# Patient Record
Sex: MALE | Race: WHITE | NOT HISPANIC OR LATINO | Employment: FULL TIME | URBAN - METROPOLITAN AREA
[De-identification: names, ages, dates, MRNs, and addresses within clinical notes are randomized per-mention and may not be internally consistent; named-entity substitution may affect disease eponyms.]

---

## 2017-03-08 ENCOUNTER — OFFICE VISIT (OUTPATIENT)
Dept: ORTHOPEDIC SURGERY | Facility: CLINIC | Age: 37
End: 2017-03-08

## 2017-03-08 DIAGNOSIS — M17.0 PRIMARY OSTEOARTHRITIS OF BOTH KNEES: Primary | ICD-10-CM

## 2017-03-08 PROCEDURE — 20610 DRAIN/INJ JOINT/BURSA W/O US: CPT | Performed by: ORTHOPAEDIC SURGERY

## 2017-03-08 RX ORDER — LIDOCAINE HYDROCHLORIDE 10 MG/ML
8 INJECTION, SOLUTION INFILTRATION; PERINEURAL
Status: COMPLETED | OUTPATIENT
Start: 2017-03-08 | End: 2017-03-08

## 2017-03-08 RX ORDER — TRIAMCINOLONE ACETONIDE 40 MG/ML
40 INJECTION, SUSPENSION INTRA-ARTICULAR; INTRAMUSCULAR
Status: COMPLETED | OUTPATIENT
Start: 2017-03-08 | End: 2017-03-08

## 2017-03-08 RX ADMIN — TRIAMCINOLONE ACETONIDE 40 MG: 40 INJECTION, SUSPENSION INTRA-ARTICULAR; INTRAMUSCULAR at 13:08

## 2017-03-08 RX ADMIN — LIDOCAINE HYDROCHLORIDE 8 ML: 10 INJECTION, SOLUTION INFILTRATION; PERINEURAL at 13:08

## 2017-03-08 NOTE — PROGRESS NOTES
Subjective: Bilateral knee pain     Patient ID: Carson Crespo is a 36 y.o. male.    Chief Complaint:    History of Present Illness 36 year old male known to me seen once again for bilateral knee pain.  Was seen over 6 months ago and underwent cortisone injection and did well to just recently.  Again now is having pain with activities of daily living.       Social History     Occupational History   • Not on file.     Social History Main Topics   • Smoking status: Current Every Day Smoker   • Smokeless tobacco: Current User   • Alcohol use Yes      Comment: 3   • Drug use: Defer   • Sexual activity: Defer      Review of Systems   Constitutional: Negative for chills, diaphoresis, fever and unexpected weight change.   HENT: Negative for hearing loss, nosebleeds, sore throat and tinnitus.    Eyes: Negative for pain and visual disturbance.   Respiratory: Negative for cough, shortness of breath and wheezing.    Cardiovascular: Negative for chest pain and palpitations.   Gastrointestinal: Negative for abdominal pain, diarrhea, nausea and vomiting.   Endocrine: Negative for cold intolerance, heat intolerance and polydipsia.   Genitourinary: Negative for difficulty urinating, dysuria and hematuria.   Musculoskeletal: Negative for arthralgias, joint swelling and myalgias.   Skin: Negative for rash and wound.   Allergic/Immunologic: Negative for environmental allergies.   Neurological: Negative for dizziness, syncope and numbness.   Hematological: Does not bruise/bleed easily.   Psychiatric/Behavioral: Negative for dysphoric mood and sleep disturbance. The patient is not nervous/anxious.    All other systems reviewed and are negative.        Past Medical History   Diagnosis Date   • CTS (carpal tunnel syndrome)    • Osteoarthritis    • Skin cancer    • Tear of meniscus of knee      No past surgical history on file.  No family history on file.      Objective:  There were no vitals filed for this visit.  There were no vitals filed  for this visit.  There is no height or weight on file to calculate BMI.       Ortho Exam  both knees have no effusion swelling erythema.  The skin is cool to touch.  His no motor deficit good distal pulses.  He has 0-125 possibly 130° of motion.  Patellofemoral crepitus but no instability.  Joint line tenderness but negative Lucho's.    Assessment:       1. Primary osteoarthritis of both knees          Plan:      .  A good response to cortisone injections last fall summer to reinject both knees with 8 cc lidocaine 1 cc Kenalog.  That was accomplished the superior lateral portal after sterile prep without Complications tolerating that well.  Postinjection instructions given to the patient.  Return as needed.      Work Status:    Brndstr query complete.    Orders:  Orders Placed This Encounter   Procedures   • Large Joint Arthrocentesis       Medications:  No orders of the defined types were placed in this encounter.      Followup:  Return if symptoms worsen or fail to improve.        Large Joint Arthrocentesis  Date/Time: 3/8/2017 1:08 PM  Consent given by: patient  Site marked: site marked  Timeout: Immediately prior to procedure a time out was called to verify the correct patient, procedure, equipment, support staff and site/side marked as required   Supporting Documentation  Indications: pain   Procedure Details  Location: knee - Knee joint: bilateral.  Preparation: Patient was prepped and draped in the usual sterile fashion  Needle size: 22 G  Medications administered: 8 mL lidocaine 1 %; 40 mg triamcinolone acetonide 40 MG/ML  Patient tolerance: patient tolerated the procedure well with no immediate complications          Dragon transcription disclaimer     Much of this encounter note is an electronic transcription/translation of spoken language to printed text. The electronic translation of spoken language may permit erroneous, or at times, nonsensical words or phrases to be inadvertently transcribed.  Although I have reviewed the note for such errors, some may still exist.

## 2017-12-05 ENCOUNTER — OFFICE (AMBULATORY)
Dept: URBAN - METROPOLITAN AREA CLINIC 75 | Facility: CLINIC | Age: 37
End: 2017-12-05

## 2017-12-05 VITALS
WEIGHT: 192 LBS | SYSTOLIC BLOOD PRESSURE: 122 MMHG | HEART RATE: 64 BPM | HEIGHT: 72 IN | DIASTOLIC BLOOD PRESSURE: 68 MMHG

## 2017-12-05 DIAGNOSIS — R13.10 DYSPHAGIA, UNSPECIFIED: ICD-10-CM

## 2017-12-05 DIAGNOSIS — K21.9 GASTRO-ESOPHAGEAL REFLUX DISEASE WITHOUT ESOPHAGITIS: ICD-10-CM

## 2017-12-05 PROCEDURE — 99204 OFFICE O/P NEW MOD 45 MIN: CPT | Performed by: INTERNAL MEDICINE

## 2018-01-05 VITALS
RESPIRATION RATE: 7 BRPM | HEART RATE: 68 BPM | HEART RATE: 55 BPM | OXYGEN SATURATION: 97 % | TEMPERATURE: 96.8 F | SYSTOLIC BLOOD PRESSURE: 110 MMHG | SYSTOLIC BLOOD PRESSURE: 116 MMHG | DIASTOLIC BLOOD PRESSURE: 76 MMHG | HEIGHT: 72 IN | SYSTOLIC BLOOD PRESSURE: 120 MMHG | HEART RATE: 71 BPM | TEMPERATURE: 97.5 F | SYSTOLIC BLOOD PRESSURE: 107 MMHG | DIASTOLIC BLOOD PRESSURE: 69 MMHG | RESPIRATION RATE: 18 BRPM | RESPIRATION RATE: 14 BRPM | HEART RATE: 78 BPM | SYSTOLIC BLOOD PRESSURE: 123 MMHG | OXYGEN SATURATION: 98 % | HEART RATE: 79 BPM | OXYGEN SATURATION: 91 % | DIASTOLIC BLOOD PRESSURE: 87 MMHG | HEART RATE: 54 BPM | OXYGEN SATURATION: 93 % | DIASTOLIC BLOOD PRESSURE: 68 MMHG | HEART RATE: 63 BPM | OXYGEN SATURATION: 99 % | RESPIRATION RATE: 20 BRPM | OXYGEN SATURATION: 96 % | WEIGHT: 185 LBS | DIASTOLIC BLOOD PRESSURE: 61 MMHG

## 2018-01-08 ENCOUNTER — OFFICE (AMBULATORY)
Dept: URBAN - METROPOLITAN AREA PATHOLOGY 4 | Facility: PATHOLOGY | Age: 38
End: 2018-01-08
Payer: COMMERCIAL

## 2018-01-08 ENCOUNTER — AMBULATORY SURGICAL CENTER (AMBULATORY)
Dept: URBAN - METROPOLITAN AREA SURGERY 17 | Facility: SURGERY | Age: 38
End: 2018-01-08

## 2018-01-08 DIAGNOSIS — K20.9 ESOPHAGITIS, UNSPECIFIED: ICD-10-CM

## 2018-01-08 DIAGNOSIS — K25.9 GASTRIC ULCER, UNSPECIFIED AS ACUTE OR CHRONIC, WITHOUT HEMO: ICD-10-CM

## 2018-01-08 DIAGNOSIS — K29.50 UNSPECIFIED CHRONIC GASTRITIS WITHOUT BLEEDING: ICD-10-CM

## 2018-01-08 DIAGNOSIS — K22.70 BARRETT'S ESOPHAGUS WITHOUT DYSPLASIA: ICD-10-CM

## 2018-01-08 DIAGNOSIS — K25.4 CHRONIC OR UNSPECIFIED GASTRIC ULCER WITH HEMORRHAGE: ICD-10-CM

## 2018-01-08 DIAGNOSIS — K22.2 ESOPHAGEAL OBSTRUCTION: ICD-10-CM

## 2018-01-08 DIAGNOSIS — R13.10 DYSPHAGIA, UNSPECIFIED: ICD-10-CM

## 2018-01-08 DIAGNOSIS — K44.9 DIAPHRAGMATIC HERNIA WITHOUT OBSTRUCTION OR GANGRENE: ICD-10-CM

## 2018-01-08 LAB
GI HISTOLOGY: A. UNSPECIFIED: (no result)
GI HISTOLOGY: B. UNSPECIFIED: (no result)
GI HISTOLOGY: C. UNSPECIFIED: (no result)
GI HISTOLOGY: PDF REPORT: (no result)

## 2018-01-08 PROCEDURE — 88342 IMHCHEM/IMCYTCHM 1ST ANTB: CPT | Performed by: INTERNAL MEDICINE

## 2018-01-08 PROCEDURE — 88341 IMHCHEM/IMCYTCHM EA ADD ANTB: CPT | Performed by: INTERNAL MEDICINE

## 2018-01-08 PROCEDURE — 88305 TISSUE EXAM BY PATHOLOGIST: CPT | Performed by: INTERNAL MEDICINE

## 2018-01-08 PROCEDURE — 43249 ESOPH EGD DILATION <30 MM: CPT | Performed by: INTERNAL MEDICINE

## 2018-01-08 PROCEDURE — 43239 EGD BIOPSY SINGLE/MULTIPLE: CPT | Mod: 59 | Performed by: INTERNAL MEDICINE

## 2018-01-08 RX ORDER — OMEPRAZOLE 20 MG/1
20 CAPSULE, DELAYED RELEASE ORAL
Qty: 30 | Refills: 10 | Status: COMPLETED
End: 2024-08-05

## 2018-01-08 RX ADMIN — PROPOFOL 75 MG: 10 INJECTION, EMULSION INTRAVENOUS at 13:31

## 2018-01-08 RX ADMIN — PROPOFOL 50 MG: 10 INJECTION, EMULSION INTRAVENOUS at 13:35

## 2018-01-08 RX ADMIN — PROPOFOL 50 MG: 10 INJECTION, EMULSION INTRAVENOUS at 13:39

## 2018-01-08 RX ADMIN — PROPOFOL 50 MG: 10 INJECTION, EMULSION INTRAVENOUS at 13:37

## 2018-01-08 RX ADMIN — PROPOFOL 50 MG: 10 INJECTION, EMULSION INTRAVENOUS at 13:33

## 2018-01-08 RX ADMIN — LIDOCAINE HYDROCHLORIDE 50 MG: 10 INJECTION, SOLUTION EPIDURAL; INFILTRATION; INTRACAUDAL; PERINEURAL at 13:31

## 2018-02-28 ENCOUNTER — OFFICE VISIT (OUTPATIENT)
Dept: ORTHOPEDIC SURGERY | Facility: CLINIC | Age: 38
End: 2018-02-28

## 2018-02-28 DIAGNOSIS — M17.0 PRIMARY OSTEOARTHRITIS OF BOTH KNEES: ICD-10-CM

## 2018-02-28 DIAGNOSIS — R52 PAIN: Primary | ICD-10-CM

## 2018-02-28 PROCEDURE — 99213 OFFICE O/P EST LOW 20 MIN: CPT | Performed by: ORTHOPAEDIC SURGERY

## 2018-02-28 PROCEDURE — 20610 DRAIN/INJ JOINT/BURSA W/O US: CPT | Performed by: ORTHOPAEDIC SURGERY

## 2018-02-28 PROCEDURE — 73562 X-RAY EXAM OF KNEE 3: CPT | Performed by: ORTHOPAEDIC SURGERY

## 2018-02-28 RX ORDER — LIDOCAINE HYDROCHLORIDE 10 MG/ML
8 INJECTION, SOLUTION EPIDURAL; INFILTRATION; INTRACAUDAL; PERINEURAL
Status: COMPLETED | OUTPATIENT
Start: 2018-02-28 | End: 2018-02-28

## 2018-02-28 RX ORDER — TRIAMCINOLONE ACETONIDE 40 MG/ML
40 INJECTION, SUSPENSION INTRA-ARTICULAR; INTRAMUSCULAR
Status: COMPLETED | OUTPATIENT
Start: 2018-02-28 | End: 2018-02-28

## 2018-02-28 RX ADMIN — LIDOCAINE HYDROCHLORIDE 8 ML: 10 INJECTION, SOLUTION EPIDURAL; INFILTRATION; INTRACAUDAL; PERINEURAL at 15:32

## 2018-02-28 RX ADMIN — TRIAMCINOLONE ACETONIDE 40 MG: 40 INJECTION, SUSPENSION INTRA-ARTICULAR; INTRAMUSCULAR at 15:32

## 2018-02-28 NOTE — PROGRESS NOTES
Subjective: Bilateral knee pain     Patient ID: Carson Crespo is a 37 y.o. male.    Chief Complaint:    History of Present Illness 37-year-old male known to although has not been seen since last March returns with bilateral knee pain left worse than right.  Once again is experiencing discomfort with rest and with activity particularly getting in and out of a chair and navigating.  Last March underwent bilateral cortisone injections which helped until just recently.  Also previously been taking meloxicam but had to stop that medication as he developed esophagitis.  He describes the pain in both knees is quite disabling interfering with most of not all activities of daily living.       Social History     Occupational History   • Not on file.     Social History Main Topics   • Smoking status: Current Every Day Smoker   • Smokeless tobacco: Current User   • Alcohol use Yes      Comment: 3   • Drug use: Defer   • Sexual activity: Defer      Review of Systems   Constitutional: Negative for chills, diaphoresis, fever and unexpected weight change.   HENT: Negative for hearing loss, nosebleeds, sore throat and tinnitus.    Eyes: Negative for pain and visual disturbance.   Respiratory: Negative for cough, shortness of breath and wheezing.    Cardiovascular: Negative for chest pain and palpitations.   Gastrointestinal: Negative for abdominal pain, diarrhea, nausea and vomiting.   Endocrine: Negative for cold intolerance, heat intolerance and polydipsia.   Genitourinary: Negative for difficulty urinating, dysuria and hematuria.   Musculoskeletal: Positive for arthralgias. Negative for joint swelling and myalgias.   Skin: Negative for rash and wound.   Allergic/Immunologic: Negative for environmental allergies.   Neurological: Negative for dizziness, syncope and numbness.   Hematological: Does not bruise/bleed easily.   Psychiatric/Behavioral: Negative for dysphoric mood and sleep disturbance. The patient is not nervous/anxious.           Past Medical History:   Diagnosis Date   • CTS (carpal tunnel syndrome)    • Osteoarthritis    • Skin cancer    • Tear of meniscus of knee      No past surgical history on file.  No family history on file.      Objective:  There were no vitals filed for this visit.  There were no vitals filed for this visit.  There is no height or weight on file to calculate BMI.       Ortho Exam  AP lateral sunrise views of both knees to evaluate his chief complaint does show significant arthritic changes.  No prior x-rays are comparison.  He is alert and oriented ×3.  Neither knee shows any swelling effusion erythema.  He has 0-125° of motion with mild patellofemoral crepitus but no subluxation.  No instability at 0 90°.  Quad function 5 over 5.  His good distal pulses no motor deficit no sensory loss in the skin is cool to touch.  His calves are nontender with a negative Homans.  Again he is not able to take anti-inflammatories now due to esophagitis.    Assessment:       1. Pain    2. Primary osteoarthritis of both knees          Plan:      splint over 10 minutes with the patient reviewing the x-rays themselves with the patient and his physical findings.  He had excellent response to cortisone injection given last year and now is unable to take oral anti-inflammatory agents due to esophagitis.  Again his x-rays do not show significant arthritic changes otherwise noted does have some articular cartilage damage from previous arthroscopy of both knees.  After discussing treatment options operative proceed with repeat cortisone injections now is given in each knee through the superolateral portal about sterile prep without complications tolerating that well.  Postinjection instructions given to the patient.  Also will send his pharmacy prescription for Voltaren gel to begin applying up to 4 times a day and attempt to get further relief.  Return to see me as needed.    Work Status:    SARAH query complete.    Orders:  Orders  Placed This Encounter   Procedures   • Large Joint Arthrocentesis   • XR Knee 3 View Bilateral       Medications:  New Medications Ordered This Visit   Medications   • diclofenac (VOLTAREN) 1 % gel gel     Sig: Apply 4 g topically 4 (Four) Times a Day.     Dispense:  100 g     Refill:  5       Followup:  Return if symptoms worsen or fail to improve.          Dragon transcription disclaimer     Much of this encounter note is an electronic transcription/translation of spoken language to printed text. The electronic translation of spoken language may permit erroneous, or at times, nonsensical words or phrases to be inadvertently transcribed. Although I have reviewed the note for such errors, some may still exist.  Large Joint Arthrocentesis  Date/Time: 2/28/2018 3:32 PM  Consent given by: patient  Site marked: site marked  Timeout: Immediately prior to procedure a time out was called to verify the correct patient, procedure, equipment, support staff and site/side marked as required   Supporting Documentation  Indications: pain   Procedure Details  Location: knee - Knee joint: bilateral.  Preparation: Patient was prepped and draped in the usual sterile fashion  Needle size: 22 G  Approach: anterolateral  Medications administered: 8 mL lidocaine PF 1% 1 %; 40 mg triamcinolone acetonide 40 MG/ML  Patient tolerance: patient tolerated the procedure well with no immediate complications

## 2019-01-28 ENCOUNTER — TELEPHONE (OUTPATIENT)
Dept: FAMILY MEDICINE CLINIC | Facility: CLINIC | Age: 39
End: 2019-01-28

## 2019-01-28 NOTE — TELEPHONE ENCOUNTER
Mr Crespo would like to become a new patient of yours.  His wife Ermelinda (07/13/1984) sees you.  He is a former patient of Dr Wadsworth.  [phone]

## 2019-02-15 ENCOUNTER — OFFICE VISIT (OUTPATIENT)
Dept: FAMILY MEDICINE CLINIC | Facility: CLINIC | Age: 39
End: 2019-02-15

## 2019-02-15 VITALS
SYSTOLIC BLOOD PRESSURE: 118 MMHG | HEIGHT: 72 IN | DIASTOLIC BLOOD PRESSURE: 70 MMHG | WEIGHT: 191 LBS | TEMPERATURE: 98.3 F | BODY MASS INDEX: 25.87 KG/M2 | RESPIRATION RATE: 16 BRPM | OXYGEN SATURATION: 98 % | HEART RATE: 65 BPM

## 2019-02-15 DIAGNOSIS — Z00.00 ENCOUNTER FOR ANNUAL PHYSICAL EXAM: Primary | ICD-10-CM

## 2019-02-15 DIAGNOSIS — J06.9 ACUTE URI: ICD-10-CM

## 2019-02-15 DIAGNOSIS — A60.00 GENITAL HERPES SIMPLEX, UNSPECIFIED SITE: ICD-10-CM

## 2019-02-15 LAB
ALBUMIN SERPL-MCNC: 4.4 G/DL (ref 3.5–5.2)
ALBUMIN/GLOB SERPL: 1.6 G/DL
ALP SERPL-CCNC: 82 U/L (ref 40–129)
ALT SERPL-CCNC: 26 U/L (ref 5–41)
AST SERPL-CCNC: 19 U/L (ref 5–40)
BASOPHILS # BLD AUTO: 0.06 10*3/MM3 (ref 0–0.2)
BASOPHILS NFR BLD AUTO: 1.1 % (ref 0–1.5)
BILIRUB SERPL-MCNC: 0.7 MG/DL (ref 0.2–1.2)
BUN SERPL-MCNC: 13 MG/DL (ref 6–20)
BUN/CREAT SERPL: 16.7 (ref 7–25)
CALCIUM SERPL-MCNC: 9.1 MG/DL (ref 8.6–10.5)
CHLORIDE SERPL-SCNC: 103 MMOL/L (ref 98–107)
CHOLEST SERPL-MCNC: 175 MG/DL (ref 0–200)
CO2 SERPL-SCNC: 25.9 MMOL/L (ref 22–29)
CREAT SERPL-MCNC: 0.78 MG/DL (ref 0.76–1.27)
EOSINOPHIL # BLD AUTO: 0.48 10*3/MM3 (ref 0–0.4)
EOSINOPHIL NFR BLD AUTO: 8.5 % (ref 0.3–6.2)
ERYTHROCYTE [DISTWIDTH] IN BLOOD BY AUTOMATED COUNT: 12.6 % (ref 12.3–15.4)
GLOBULIN SER CALC-MCNC: 2.7 GM/DL
GLUCOSE SERPL-MCNC: 87 MG/DL (ref 65–99)
HCT VFR BLD AUTO: 45.2 % (ref 37.5–51)
HDLC SERPL-MCNC: 39 MG/DL (ref 40–60)
HGB BLD-MCNC: 14.9 G/DL (ref 13–17.7)
IMM GRANULOCYTES # BLD AUTO: 0.03 10*3/MM3 (ref 0–0.05)
IMM GRANULOCYTES NFR BLD AUTO: 0.5 % (ref 0–0.5)
LDLC SERPL CALC-MCNC: 122 MG/DL (ref 0–100)
LDLC/HDLC SERPL: 3.12 {RATIO}
LYMPHOCYTES # BLD AUTO: 1.46 10*3/MM3 (ref 0.7–3.1)
LYMPHOCYTES NFR BLD AUTO: 25.7 % (ref 19.6–45.3)
MCH RBC QN AUTO: 29.7 PG (ref 26.6–33)
MCHC RBC AUTO-ENTMCNC: 33 G/DL (ref 31.5–35.7)
MCV RBC AUTO: 90.2 FL (ref 79–97)
MONOCYTES # BLD AUTO: 0.55 10*3/MM3 (ref 0.1–0.9)
MONOCYTES NFR BLD AUTO: 9.7 % (ref 5–12)
NEUTROPHILS # BLD AUTO: 3.1 10*3/MM3 (ref 1.4–7)
NEUTROPHILS NFR BLD AUTO: 54.5 % (ref 42.7–76)
NRBC BLD AUTO-RTO: 0 /100 WBC (ref 0–0)
PLATELET # BLD AUTO: 232 10*3/MM3 (ref 140–450)
POTASSIUM SERPL-SCNC: 4.5 MMOL/L (ref 3.5–5.2)
PROT SERPL-MCNC: 7.1 G/DL (ref 6–8.5)
RBC # BLD AUTO: 5.01 10*6/MM3 (ref 4.14–5.8)
SODIUM SERPL-SCNC: 139 MMOL/L (ref 136–145)
TRIGL SERPL-MCNC: 71 MG/DL (ref 0–150)
VLDLC SERPL CALC-MCNC: 14.2 MG/DL (ref 8–32)
WBC # BLD AUTO: 5.68 10*3/MM3 (ref 3.4–10.8)

## 2019-02-15 PROCEDURE — 99386 PREV VISIT NEW AGE 40-64: CPT | Performed by: PHYSICIAN ASSISTANT

## 2019-02-15 RX ORDER — METHYLPREDNISOLONE 4 MG/1
TABLET ORAL
Qty: 21 TABLET | Refills: 0 | Status: SHIPPED | OUTPATIENT
Start: 2019-02-15 | End: 2019-04-26

## 2019-02-15 RX ORDER — VALACYCLOVIR HYDROCHLORIDE 500 MG/1
500 TABLET, FILM COATED ORAL 2 TIMES DAILY
Qty: 21 TABLET | Refills: 3 | Status: SHIPPED | OUTPATIENT
Start: 2019-02-15 | End: 2019-07-09

## 2019-02-15 RX ORDER — BENZONATATE 200 MG/1
200 CAPSULE ORAL 3 TIMES DAILY PRN
Qty: 30 CAPSULE | Refills: 0 | Status: SHIPPED | OUTPATIENT
Start: 2019-02-15 | End: 2019-04-26
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
## 2019-04-26 ENCOUNTER — OFFICE VISIT (OUTPATIENT)
Dept: FAMILY MEDICINE CLINIC | Facility: CLINIC | Age: 39
End: 2019-04-26

## 2019-04-26 VITALS
DIASTOLIC BLOOD PRESSURE: 76 MMHG | SYSTOLIC BLOOD PRESSURE: 118 MMHG | BODY MASS INDEX: 25.19 KG/M2 | HEIGHT: 72 IN | OXYGEN SATURATION: 98 % | WEIGHT: 186 LBS | TEMPERATURE: 98.5 F | RESPIRATION RATE: 16 BRPM | HEART RATE: 81 BPM

## 2019-04-26 DIAGNOSIS — J01.00 ACUTE MAXILLARY SINUSITIS, RECURRENCE NOT SPECIFIED: Primary | ICD-10-CM

## 2019-04-26 PROCEDURE — 99213 OFFICE O/P EST LOW 20 MIN: CPT | Performed by: PHYSICIAN ASSISTANT

## 2019-04-26 RX ORDER — AZITHROMYCIN 250 MG/1
TABLET, FILM COATED ORAL
Qty: 6 TABLET | Refills: 0 | Status: SHIPPED | OUTPATIENT
Start: 2019-04-26 | End: 2019-07-09

## 2019-04-26 RX ORDER — OMEPRAZOLE 20 MG/1
20 CAPSULE, DELAYED RELEASE ORAL EVERY MORNING
Refills: 9 | COMMUNITY
Start: 2019-02-25 | End: 2020-04-09 | Stop reason: SDUPTHER

## 2019-04-26 RX ORDER — BROMPHENIRAMINE MALEATE, PSEUDOEPHEDRINE HYDROCHLORIDE, AND DEXTROMETHORPHAN HYDROBROMIDE 2; 30; 10 MG/5ML; MG/5ML; MG/5ML
5 SYRUP ORAL 4 TIMES DAILY PRN
Qty: 118 ML | Refills: 0 | Status: SHIPPED | OUTPATIENT
Start: 2019-04-26 | End: 2019-07-09

## 2019-04-26 RX ORDER — METHYLPREDNISOLONE 4 MG/1
TABLET ORAL
Qty: 21 TABLET | Refills: 0 | Status: SHIPPED | OUTPATIENT
Start: 2019-04-26 | End: 2019-07-09

## 2019-04-26 NOTE — PROGRESS NOTES
"Subjective   Carson Crespo is a 38 y.o. male presents for   Chief Complaint   Patient presents with   • Nasal Congestion     with yellow drainage   • Cough     productive with brown/yellow sputum   • Headache   • Sore Throat     x3 days       History of Present Illness     Carson is a 38-year-old male who presents with nasal congestion, yellow rhinorrhea, yellow- tan productive cough, headache, sore throat,watery eyes,sinus pressure,ear pressure,post nasal drip,and fatigue for the past week.  Denied any nausea,vomiting,diarrhea,fevers,chills or shortness of air/wheezing.  He has taken OTC Mucinex,Flonase and Zyrtec.  Appetite has been normal.  Sleep has been normal.      The following portions of the patient's history were reviewed and updated as appropriate: allergies, current medications, past family history, past medical history, past social history, past surgical history and problem list.    Review of Systems   Constitutional: Positive for fatigue. Negative for chills and fever.   HENT: Positive for congestion, ear pain, postnasal drip, rhinorrhea, sinus pressure, sinus pain and sore throat.    Eyes: Negative.    Respiratory: Positive for cough. Negative for shortness of breath and wheezing.    Cardiovascular: Negative.  Negative for chest pain, palpitations and leg swelling.   Gastrointestinal: Negative.  Negative for diarrhea, nausea and vomiting.   Endocrine: Negative.    Genitourinary: Negative.    Musculoskeletal: Negative.    Skin: Negative.    Allergic/Immunologic: Negative.    Neurological: Positive for headaches. Negative for dizziness and light-headedness.   Hematological: Negative.    Psychiatric/Behavioral: Negative.    All other systems reviewed and are negative.        Vitals:    04/26/19 1534   BP: 118/76   Pulse: 81   Resp: 16   Temp: 98.5 °F (36.9 °C)   SpO2: 98%   Weight: 84.4 kg (186 lb)   Height: 182.9 cm (72\")     Wt Readings from Last 3 Encounters:   04/26/19 84.4 kg (186 lb)   02/15/19 86.6 " kg (191 lb)     BP Readings from Last 3 Encounters:   04/26/19 118/76   02/15/19 118/70   02/11/19 137/85     Social History     Socioeconomic History   • Marital status:      Spouse name: Not on file   • Number of children: Not on file   • Years of education: Not on file   • Highest education level: Not on file   Tobacco Use   • Smoking status: Former Smoker   • Smokeless tobacco: Current User   Substance and Sexual Activity   • Alcohol use: Yes     Comment: 3   • Drug use: Defer   • Sexual activity: Defer       Allergies   Allergen Reactions   • Bee Pollen Swelling   • Penicillins Swelling       Body mass index is 25.23 kg/m².    Objective   Physical Exam   Constitutional: He is oriented to person, place, and time. Vital signs are normal. He appears well-developed and well-nourished.   HENT:   Head: Normocephalic and atraumatic.   Right Ear: Hearing, external ear and ear canal normal. Tympanic membrane is bulging.   Left Ear: Hearing, tympanic membrane, external ear and ear canal normal.   Nose: Rhinorrhea present. Right sinus exhibits maxillary sinus tenderness. Left sinus exhibits maxillary sinus tenderness.   Mouth/Throat: Uvula is midline and mucous membranes are normal.   1+ cobblestoning noted   Eyes: Conjunctivae, EOM and lids are normal. Pupils are equal, round, and reactive to light.   Neck: Trachea normal and phonation normal. Neck supple. No edema present.   Cardiovascular: Normal rate, regular rhythm, S1 normal, S2 normal, normal heart sounds and normal pulses.   Pulmonary/Chest: Effort normal and breath sounds normal.   Abdominal: Soft. Normal appearance, normal aorta and bowel sounds are normal. There is no hepatomegaly. There is no tenderness.   Lymphadenopathy:     He has no cervical adenopathy.   Neurological: He is alert and oriented to person, place, and time.   Skin: Skin is warm, dry and intact. Capillary refill takes less than 2 seconds.   Psychiatric: He has a normal mood and affect.  His speech is normal and behavior is normal. Judgment and thought content normal. Cognition and memory are normal.       Assessment/Plan   Carson was seen today for nasal congestion, cough, headache and sore throat.    Diagnoses and all orders for this visit:    Acute maxillary sinusitis, recurrence not specified  -     azithromycin (ZITHROMAX Z-RED) 250 MG tablet; Take 2 tablets the first day, then 1 tablet daily for 4 days.  -     methylPREDNISolone (MEDROL, RED,) 4 MG tablet; Take as directed on package instructions.  -     brompheniramine-pseudoephedrine-DM 30-2-10 MG/5ML syrup; Take 5 mL by mouth 4 (Four) Times a Day As Needed for Congestion or Cough.    Mr. Carson Crespo was seen in office today and diagnosed with new onset acute maxillary sinusitis.  I have prescribed a Z-Red, Medrol Dosepak and Bromfed-DM prescriptions to pharmacy.  He will hold his antihistamines at home for now.  He was instructed to increase fluid intake and rest as much as possible.  He will return to office if symptoms do not improve.      BORIS [facility]  [address] [address]  Dept: [phone]  Dept Fax: [phone]  Loc: [phone]  Loc Fax: [phone]

## 2019-07-01 ENCOUNTER — OFFICE VISIT (OUTPATIENT)
Dept: ORTHOPEDIC SURGERY | Facility: CLINIC | Age: 39
End: 2019-07-01

## 2019-07-01 VITALS — WEIGHT: 182 LBS | BODY MASS INDEX: 24.65 KG/M2 | HEIGHT: 72 IN

## 2019-07-01 DIAGNOSIS — R52 PAIN: Primary | ICD-10-CM

## 2019-07-01 DIAGNOSIS — M17.0 PRIMARY OSTEOARTHRITIS OF BOTH KNEES: ICD-10-CM

## 2019-07-01 PROCEDURE — 73562 X-RAY EXAM OF KNEE 3: CPT | Performed by: NURSE PRACTITIONER

## 2019-07-01 PROCEDURE — 99213 OFFICE O/P EST LOW 20 MIN: CPT | Performed by: NURSE PRACTITIONER

## 2019-07-01 PROCEDURE — 20610 DRAIN/INJ JOINT/BURSA W/O US: CPT | Performed by: NURSE PRACTITIONER

## 2019-07-01 RX ADMIN — TRIAMCINOLONE ACETONIDE 80 MG: 40 INJECTION, SUSPENSION INTRA-ARTICULAR; INTRAMUSCULAR at 15:25

## 2019-07-01 NOTE — PROGRESS NOTES
Subjective:     Patient ID: Carson Crespo is a 39 y.o. male.    Chief Complaint:  Bilateral knee pain, new issue to examiner  History of Present Illness  Carson Crespo is a 39-year-old male clinic with a one-week history of worsening pain in bilateral knees, greatest at the right knee.  A pain noted at the medial aspect and medial joint line.  Increased pain noted with kneeling, activities involving deep flexion, ambulating standing for long time, kneeling and ambulating on uneven surfaces.  He has been seen by Dr. Aguilera in the past has received corticosteroid injections 1 year of bilateral knee and has done except being as well as time.  He is to take an time to taking to [place] as well as glucosamine on a daily basis.  The last 1 week he has noted significant pain and swelling specifically at the right knee.  He has applied ice over the weekend which did help significantly decreased swelling.  Initially he was unable to fully extend the knee secondary to the pain and felt as if the knee was then give out on him.  After icing symptoms have subsided however he still experiencing mild pain rates at a 4-5 out of 10 mainly aching in nature.  Radiating down bilateral lower extremity.  Denies the pain radiating  into the hip and groin.  Denies presence of numbness or tingling iDenies other concerns present at this time.     Social History     Occupational History   • Not on file   Tobacco Use   • Smoking status: Former Smoker   • Smokeless tobacco: Current User   Substance and Sexual Activity   • Alcohol use: Yes     Comment: 3   • Drug use: Defer   • Sexual activity: Defer      Past Medical History:   Diagnosis Date   • CTS (carpal tunnel syndrome)    • Genital herpes    • Osteoarthritis    • Skin cancer    • Tear of meniscus of knee      No past surgical history on file.    Family History   Problem Relation Age of Onset   • No Known Problems Mother    • No Known Problems Father          Review of Systems  "  Constitutional: Negative for chills, diaphoresis, fever and unexpected weight change.   HENT: Negative for hearing loss, nosebleeds, sore throat and tinnitus.    Eyes: Negative for pain and visual disturbance.   Respiratory: Negative for cough, shortness of breath and wheezing.    Cardiovascular: Negative for chest pain and palpitations.   Gastrointestinal: Negative for abdominal pain, diarrhea, nausea and vomiting.   Endocrine: Negative for cold intolerance, heat intolerance and polydipsia.   Genitourinary: Negative for difficulty urinating, dysuria and hematuria.   Musculoskeletal: Positive for arthralgias. Negative for joint swelling and myalgias.   Skin: Negative for rash and wound.   Allergic/Immunologic: Negative for environmental allergies.   Neurological: Negative for dizziness, syncope and numbness.   Hematological: Does not bruise/bleed easily.   Psychiatric/Behavioral: Negative for dysphoric mood and sleep disturbance. The patient is not nervous/anxious.            Objective:  Physical Exam    General: No acute distress.  Eyes: conjunctiva clear; pupils equally round and reactive  ENT: external ears and nose atraumatic; oropharynx clear  CV: no peripheral edema  Resp: normal respiratory effort  Skin: no rashes or wounds; normal turgor  Psych: mood and affect appropriate; recent and remote memory intact    Vitals:    07/01/19 1449   Weight: 82.6 kg (182 lb)   Height: 182.9 cm (72\")         07/01/19  1449   Weight: 82.6 kg (182 lb)     Body mass index is 24.68 kg/m².      Right Knee Exam     Tenderness   The patient is experiencing tenderness in the patella and medial joint line.    Range of Motion   Extension: 0   Flexion: 130     Tests   Lucho:  Medial - negative Lateral - negative  Varus: negative Valgus: negative  Lachman:  Anterior - 1+    Posterior - negative  Drawer:  Anterior - negative      Patellar apprehension: positive    Other   Erythema: absent  Scars: absent  Sensation: normal  Pulse: " present  Swelling: mild  Effusion: no effusion present    Comments:  Positive crepitus throughout arc of motion  Maximal tenderness medial patellar facet      Left Knee Exam     Tenderness   The patient is experiencing tenderness in the patella.    Range of Motion   Extension: 0   Flexion: 130     Tests   Lucho:  Medial - negative Lateral - negative  Varus: negative Valgus: negative  Lachman:  Anterior - 1+    Posterior - negative  Drawer:  Anterior - negative     Posterior - negative    Other   Erythema: absent  Sensation: normal  Swelling: mild  Effusion: no effusion present               Imaging:  Bilateral Knee X-Ray  Indication: Pain    AP, Lateral, and [place] views    Findings:  No fracture  No bony lesion  Normal soft tissues  Medial compartment narrowing and patellofemoral joint narrowing     Prior studies were available for comparison.    Assessment:        1. Pain    2. Primary osteoarthritis of both knees           Plan:  1.  Discussed plan of care with patient.  He wishes to proceed with corticosteroid injection bilateral knees at this time.  To see him back in clinic improving.  Encouraged him to continue with quad hamstring, calf strengthening activities which helped significantly.  Understanding of all information and agrees with plan of care.  Denies all other concerns present at this point.  Large Joint Arthrocentesis  Date/Time: 7/1/2019 3:25 PM  Consent given by: patient  Site marked: site marked  Timeout: Immediately prior to procedure a time out was called to verify the correct patient, procedure, equipment, support staff and site/side marked as required   Supporting Documentation  Indications: pain   Procedure Details  Location: knee - Knee joint: bilateral.  Preparation: Patient was prepped and draped in the usual sterile fashion  Needle size: 22 G  Medications administered: 8 mL lidocaine (cardiac); 80 mg triamcinolone acetonide 40 MG/ML  Patient tolerance: patient tolerated the procedure  well with no immediate complications          Orders:  Orders Placed This Encounter   Procedures   • Large Joint Arthrocentesis   • XR Knee 3 View Bilateral       I ordered and reviewed the SARAH today.     Dictated utilizing Dragon dictation

## 2019-07-02 RX ORDER — TRIAMCINOLONE ACETONIDE 40 MG/ML
80 INJECTION, SUSPENSION INTRA-ARTICULAR; INTRAMUSCULAR
Status: COMPLETED | OUTPATIENT
Start: 2019-07-01 | End: 2019-07-01

## 2019-07-09 ENCOUNTER — OFFICE VISIT (OUTPATIENT)
Dept: FAMILY MEDICINE CLINIC | Facility: CLINIC | Age: 39
End: 2019-07-09

## 2019-07-09 VITALS
WEIGHT: 188 LBS | RESPIRATION RATE: 16 BRPM | BODY MASS INDEX: 25.47 KG/M2 | TEMPERATURE: 99.1 F | HEIGHT: 72 IN | OXYGEN SATURATION: 98 % | HEART RATE: 68 BPM | DIASTOLIC BLOOD PRESSURE: 80 MMHG | SYSTOLIC BLOOD PRESSURE: 130 MMHG

## 2019-07-09 DIAGNOSIS — J02.9 PHARYNGITIS, UNSPECIFIED ETIOLOGY: Primary | ICD-10-CM

## 2019-07-09 DIAGNOSIS — J02.9 SORE THROAT: ICD-10-CM

## 2019-07-09 LAB
EXPIRATION DATE: NORMAL
INTERNAL CONTROL: NORMAL
Lab: NORMAL
S PYO AG THROAT QL: NEGATIVE

## 2019-07-09 PROCEDURE — 99213 OFFICE O/P EST LOW 20 MIN: CPT | Performed by: NURSE PRACTITIONER

## 2019-07-09 PROCEDURE — 87880 STREP A ASSAY W/OPTIC: CPT | Performed by: NURSE PRACTITIONER

## 2019-07-09 RX ORDER — AZITHROMYCIN 250 MG/1
TABLET, FILM COATED ORAL
Qty: 6 TABLET | Refills: 0 | Status: SHIPPED | OUTPATIENT
Start: 2019-07-09 | End: 2019-08-12

## 2019-07-09 RX ORDER — METHYLPREDNISOLONE 4 MG/1
TABLET ORAL
Qty: 1 EACH | Refills: 0 | Status: SHIPPED | OUTPATIENT
Start: 2019-07-09 | End: 2019-08-12

## 2019-07-09 NOTE — PROGRESS NOTES
"Subjective      Chief Complaint   Patient presents with   • Sore Throat       Carson Crespo is a 39 y.o. male who presents for evaluation of sore throat. Associated symptoms include dry cough, sore throat and tongue white and swollen. Onset of symptoms was 4 days ago, and have been unchanged since that time. He is drinking plenty of fluids. He has not had a recent close exposure to someone with proven streptococcal pharyngitis.    The following portions of the patient's history were reviewed and updated as appropriate: past family history, past social history, past surgical history and problem list.    Review of Systems  A comprehensive review of systems was negative except for: Ears, nose, mouth, throat, and face: positive for sore throat     Objective   /80 (BP Location: Left arm, Patient Position: Sitting, Cuff Size: Adult)   Pulse 68   Temp 99.1 °F (37.3 °C)   Resp 16   Ht 182.9 cm (72\")   Wt 85.3 kg (188 lb)   SpO2 98%   BMI 25.50 kg/m²   General appearance: alert, appears stated age and cooperative  Ears: normal TM's and external ear canals both ears  Throat: abnormal findings: mild oropharyngeal erythema  Neck: no adenopathy  Lungs: clear to auscultation bilaterally  Heart: regular rate and rhythm, S1, S2 normal, no murmur, click, rub or gallop  Skin: Skin color, texture, turgor normal. No rashes or lesions    Laboratory  Strep test done. Results:negative.    Assessment/Plan   Acute pharyngitis, likely   Sinusitis with post nasal drip.     Patient placed on antibiotics.  Use of OTC analgesics recommended as well as salt water gargles.  Use of decongestant recommended.  Follow up as needed.      Renée Salvador, APRN       "

## 2019-08-12 ENCOUNTER — OFFICE VISIT (OUTPATIENT)
Dept: FAMILY MEDICINE CLINIC | Facility: CLINIC | Age: 39
End: 2019-08-12

## 2019-08-12 ENCOUNTER — HOSPITAL ENCOUNTER (OUTPATIENT)
Dept: GENERAL RADIOLOGY | Facility: HOSPITAL | Age: 39
Discharge: HOME | End: 2019-08-12
Admitting: PHYSICIAN ASSISTANT

## 2019-08-12 VITALS
DIASTOLIC BLOOD PRESSURE: 86 MMHG | RESPIRATION RATE: 16 BRPM | BODY MASS INDEX: 25.19 KG/M2 | WEIGHT: 186 LBS | OXYGEN SATURATION: 98 % | HEART RATE: 71 BPM | TEMPERATURE: 98 F | HEIGHT: 72 IN | SYSTOLIC BLOOD PRESSURE: 142 MMHG

## 2019-08-12 DIAGNOSIS — M89.8X1 PAIN OF RIGHT SCAPULA: Primary | ICD-10-CM

## 2019-08-12 DIAGNOSIS — M89.8X1 PAIN OF RIGHT SCAPULA: ICD-10-CM

## 2019-08-12 PROCEDURE — 99214 OFFICE O/P EST MOD 30 MIN: CPT | Performed by: PHYSICIAN ASSISTANT

## 2019-08-12 PROCEDURE — 96372 THER/PROPH/DIAG INJ SC/IM: CPT | Performed by: PHYSICIAN ASSISTANT

## 2019-08-12 PROCEDURE — 73010 X-RAY EXAM OF SHOULDER BLADE: CPT

## 2019-08-12 RX ORDER — METHYLPREDNISOLONE ACETATE 40 MG/ML
40 INJECTION, SUSPENSION INTRA-ARTICULAR; INTRALESIONAL; INTRAMUSCULAR; SOFT TISSUE ONCE
Status: COMPLETED | OUTPATIENT
Start: 2019-08-12 | End: 2019-08-12

## 2019-08-12 RX ORDER — HYDROCODONE BITARTRATE AND ACETAMINOPHEN 5; 325 MG/1; MG/1
1 TABLET ORAL EVERY 6 HOURS PRN
Qty: 12 TABLET | Refills: 0 | Status: SHIPPED | OUTPATIENT
Start: 2019-08-12 | End: 2020-11-24

## 2019-08-12 RX ORDER — METHOCARBAMOL 750 MG/1
750 TABLET, FILM COATED ORAL 3 TIMES DAILY
Qty: 30 TABLET | Refills: 0 | Status: SHIPPED | OUTPATIENT
Start: 2019-08-12 | End: 2019-11-04

## 2019-08-12 RX ORDER — CETIRIZINE HYDROCHLORIDE 5 MG/1
5 TABLET ORAL DAILY
COMMUNITY

## 2019-08-12 RX ADMIN — METHYLPREDNISOLONE ACETATE 40 MG: 40 INJECTION, SUSPENSION INTRA-ARTICULAR; INTRALESIONAL; INTRAMUSCULAR; SOFT TISSUE at 17:21

## 2019-08-12 NOTE — PROGRESS NOTES
"Subjective   Carson Crespo is a 39 y.o. male presents for   Chief Complaint   Patient presents with   • Shoulder Pain     Rt Shoulder       History of Present Illness     Carson is a 39-year-old male who presents with right shoulder/scapula pain the past 1-2 weeks.  States he has been doing weed eating and has noticed that his right shoulder has been slightly painful.  He has noticed over the past few days his scapula and right shoulder has increased in pain.  States he has noticed decreased range of motion.  Sleep has been affected due to the pain.  Denied any recent injury or trauma to right shoulder or scapula area.  He has tried ice but states it made it more stiff feeling.  Describes the pain as a sharp pain when he moves certain ways.  Appetite has been normal.  Denied any nausea, vomiting, or abdominal pain.    The following portions of the patient's history were reviewed and updated as appropriate: allergies, current medications, past family history, past medical history, past social history, past surgical history and problem list.    Review of Systems   Constitutional: Negative.    HENT: Negative.    Eyes: Negative.    Respiratory: Negative.    Cardiovascular: Negative.    Gastrointestinal: Negative.    Endocrine: Negative.    Genitourinary: Negative.    Musculoskeletal: Negative.         Right shoulder/scapula pain   Skin: Negative.    Allergic/Immunologic: Negative.    Neurological: Negative.    Hematological: Negative.    Psychiatric/Behavioral: Negative.    All other systems reviewed and are negative.        Vitals:    08/12/19 1644   BP: 142/86   Pulse: 71   Resp: 16   Temp: 98 °F (36.7 °C)   SpO2: 98%   Weight: 84.4 kg (186 lb)   Height: 182.9 cm (72\")     Wt Readings from Last 3 Encounters:   08/12/19 84.4 kg (186 lb)   07/09/19 85.3 kg (188 lb)   07/01/19 82.6 kg (182 lb)     BP Readings from Last 3 Encounters:   08/12/19 142/86   07/09/19 130/80   04/26/19 118/76     Social History     Socioeconomic " History   • Marital status:      Spouse name: Not on file   • Number of children: Not on file   • Years of education: Not on file   • Highest education level: Not on file   Tobacco Use   • Smoking status: Former Smoker   • Smokeless tobacco: Current User   Substance and Sexual Activity   • Alcohol use: Yes     Comment: 3   • Drug use: Defer   • Sexual activity: Defer       Allergies   Allergen Reactions   • Bee Pollen Swelling   • Penicillins Swelling       Body mass index is 25.23 kg/m².    Objective   Physical Exam   Constitutional: He is oriented to person, place, and time. Vital signs are normal. He appears well-developed and well-nourished.   Neck: Trachea normal, normal range of motion, full passive range of motion without pain and phonation normal. Neck supple. No tracheal tenderness, no spinous process tenderness and no muscular tenderness present. No neck rigidity. No tracheal deviation, no edema, no erythema and normal range of motion present.   Cardiovascular: Normal rate, regular rhythm, S1 normal, S2 normal, normal heart sounds and normal pulses.   No murmur heard.  Pulmonary/Chest: Effort normal and breath sounds normal.   Abdominal: Soft. Normal appearance and bowel sounds are normal. There is no hepatomegaly. There is no tenderness.   Musculoskeletal:        Right shoulder: He exhibits decreased range of motion, tenderness, bony tenderness, pain, spasm and decreased strength. He exhibits no effusion, no crepitus, no deformity and no laceration.        Arms:  Neurological: He is alert and oriented to person, place, and time. No sensory deficit.   Skin: Skin is warm, dry and intact. Capillary refill takes less than 2 seconds.   Psychiatric: He has a normal mood and affect. His speech is normal and behavior is normal. Judgment and thought content normal. Cognition and memory are normal.       Assessment/Plan   Carson was seen today for shoulder pain.    Diagnoses and all orders for this  visit:    Pain of right scapula  -     XR Scapula Right; Future  -     methylPREDNISolone acetate (DEPO-medrol) injection 40 mg  -     methocarbamol (ROBAXIN) 750 MG tablet; Take 1 tablet by mouth 3 (Three) Times a Day. For right shoulder/scapula pain      Mr. Carson Crespo was seen in office today with new right scapula pain.  He will have a right scapula x-ray at the [facility] facility today for further evaluation.  I suspect he may have a strain.  He will have a 40 mg IM injection of Depo-Medrol at office visit today.  I have sent to pharmacy Robaxin.  Renée Head, nurse practitioner has approved a short-term Norco 5/325 mg prescription to take 1 tablet every 4-6 hours as needed for pain dispense 12 with no refills.  This was sent electronically to pharmacy.  He will be notified of imaging results when completed.  I will consider referral to orthopedist or do physical therapy in future if warranted.      As part of this patient's treatment plan I am prescribing controlled substances.  The patient has been made aware of appropriate use of such medications, including potential risk of somnolence. Limited ability to drive and/or work safely, and potential for dependence or overdose.  It has also been made clear that these medications are for use by this patient only, without concomitant use of alcohol or other substances unless prescribed.  SARAH report has been reviewed and scanned into the patient's chart.  Sarah number is [id number] dated August 10,2019.      BORIS Palmer [facility]  [address] [address]  Dept: [phone]  Dept Fax: [phone]  Loc: [phone]  Loc Fax: [phone]

## 2019-08-14 ENCOUNTER — TELEPHONE (OUTPATIENT)
Dept: FAMILY MEDICINE CLINIC | Facility: CLINIC | Age: 39
End: 2019-08-14

## 2019-08-14 DIAGNOSIS — M89.8X1 PAIN OF RIGHT SCAPULA: Primary | ICD-10-CM

## 2019-08-14 RX ORDER — METHYLPREDNISOLONE 4 MG/1
TABLET ORAL
Qty: 21 TABLET | Refills: 0 | Status: SHIPPED | OUTPATIENT
Start: 2019-08-14 | End: 2019-11-04

## 2019-08-14 NOTE — TELEPHONE ENCOUNTER
Pt calling, states he is still in significant pain.  Would like to know what the next step is to treat this?    Please advise.

## 2019-08-14 NOTE — TELEPHONE ENCOUNTER
Notify patient I have placed orders for physical therapy at the [facility].  If no improvement will do MRI.  We will send a Medrol Dosepak to pharmacy to start today as well.

## 2019-08-16 ENCOUNTER — TELEPHONE (OUTPATIENT)
Dept: FAMILY MEDICINE CLINIC | Facility: CLINIC | Age: 39
End: 2019-08-16

## 2019-08-16 DIAGNOSIS — M89.8X1 PAIN OF RIGHT SCAPULA: Primary | ICD-10-CM

## 2019-08-16 RX ORDER — DICLOFENAC SODIUM 75 MG/1
75 TABLET, DELAYED RELEASE ORAL 2 TIMES DAILY
Qty: 60 TABLET | Refills: 0 | Status: SHIPPED | OUTPATIENT
Start: 2019-08-16 | End: 2019-09-12 | Stop reason: SDUPTHER

## 2019-08-16 NOTE — TELEPHONE ENCOUNTER
1.  Notify patient I have sent diclofenac to pharmacy to take 2 times a day as needed for pain.  Have him finish his Medrol Dosepak and his Robaxin medication.  Apply moist heat to the area.  2.  Has to do physical therapy before MRI is usually approved by his insurance.  These keep appointment next week with the physical therapist.  They will update status as well.  3.  If his symptoms worsen, will need to be reevaluated.

## 2019-08-16 NOTE — TELEPHONE ENCOUNTER
Pt states he only has 1 more pain pill he has taken 3 on a couple of days. He would like a refill of pain medication. Patient also states he went a got a second opinion from another primary care doctor his wife got him in with  because he was in a lot of pain yesterday and thought we should have done the MRI that day. He states the other doctor that he went to said he needed to try PT first b/c usually insurance won't pay for an MRI and because if it wasn't torn then he wouldn't be a candidate for surgery anyway.

## 2019-08-16 NOTE — TELEPHONE ENCOUNTER
Pt calling, requesting refill for his pain medication.  Pt states he cannot get into PT until late next week. He mentioned he is having more pain now than he has with this issue, and nothing but the hydrocodone helps.    Please advise.

## 2019-08-21 ENCOUNTER — TREATMENT (OUTPATIENT)
Dept: PHYSICAL THERAPY | Facility: CLINIC | Age: 39
End: 2019-08-21

## 2019-08-21 DIAGNOSIS — R52 PAIN AGGRAVATED BY LIFTING: ICD-10-CM

## 2019-08-21 DIAGNOSIS — R26.89 DECREASED FUNCTIONAL MOBILITY: ICD-10-CM

## 2019-08-21 DIAGNOSIS — M89.8X1 PAIN OF RIGHT SCAPULA: Primary | ICD-10-CM

## 2019-08-21 PROCEDURE — 97014 ELECTRIC STIMULATION THERAPY: CPT | Performed by: PHYSICAL THERAPIST

## 2019-08-21 PROCEDURE — 97140 MANUAL THERAPY 1/> REGIONS: CPT | Performed by: PHYSICAL THERAPIST

## 2019-08-21 PROCEDURE — 97162 PT EVAL MOD COMPLEX 30 MIN: CPT | Performed by: PHYSICAL THERAPIST
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
## 2019-08-23 ENCOUNTER — TREATMENT (OUTPATIENT)
Dept: PHYSICAL THERAPY | Facility: CLINIC | Age: 39
End: 2019-08-23

## 2019-08-23 DIAGNOSIS — M89.8X1 PAIN OF RIGHT SCAPULA: Primary | ICD-10-CM

## 2019-08-23 DIAGNOSIS — R26.89 DECREASED FUNCTIONAL MOBILITY: ICD-10-CM

## 2019-08-23 DIAGNOSIS — R52 PAIN AGGRAVATED BY LIFTING: ICD-10-CM

## 2019-08-23 PROCEDURE — 97014 ELECTRIC STIMULATION THERAPY: CPT | Performed by: PHYSICAL THERAPIST

## 2019-08-23 PROCEDURE — 97012 MECHANICAL TRACTION THERAPY: CPT | Performed by: PHYSICAL THERAPIST

## 2019-08-23 NOTE — PROGRESS NOTES
"Physical Therapy Daily Progress Note    Visit # : 2  Carson Crespo reports: he is feeling better and denies having any increased pain down the right arm with writing or typing today.      Subjective     Objective   See Exercise, Manual, and Modality Logs for complete treatment.     Mild to moderate tenderness over the right MT,rhomboid area and teres minor with palpation.        Assessment & Plan     Assessment  Assessment details: Pt tolerated treatment fairly well today, but c/o some right MT/rhomboid \"burning\" pain during supine mechanical ICT.  May change pt's cervical angle next visit during traction.  Pt with less right scapula and shoulder pain since last visit.  Pt stated he left the KT tape on until this am and felt that helped support his shoulder and decreased pain.  Will reassess pt's symptoms next visit and will continue to see for ICT, KT taping, stretching, and modalities PRN for pain.          Progress per Plan of Care           Manual Therapy:         mins  62421;  Therapeutic Exercise:         mins  24996;     Neuromuscular Alex:        mins  49637;    Therapeutic Activity:          mins  54992;     Gait Training:           mins  88972;     Ultrasound:          mins  60188;    Electrical Stimulation:    15     mins  27323 ( );  Dry Needling          mins self-pay  Cervical Traction          15 mins 24168    Timed Treatment:      mins   Total Treatment:     57   mins    Selene Kang, PT  Physical Therapist  "

## 2019-08-26 ENCOUNTER — TREATMENT (OUTPATIENT)
Dept: PHYSICAL THERAPY | Facility: CLINIC | Age: 39
End: 2019-08-26

## 2019-08-26 DIAGNOSIS — M89.8X1 PAIN OF RIGHT SCAPULA: Primary | ICD-10-CM

## 2019-08-26 DIAGNOSIS — R26.89 DECREASED FUNCTIONAL MOBILITY: ICD-10-CM

## 2019-08-26 DIAGNOSIS — R52 PAIN AGGRAVATED BY LIFTING: ICD-10-CM

## 2019-08-26 PROCEDURE — 97014 ELECTRIC STIMULATION THERAPY: CPT | Performed by: PHYSICAL THERAPIST

## 2019-08-26 PROCEDURE — 97110 THERAPEUTIC EXERCISES: CPT | Performed by: PHYSICAL THERAPIST

## 2019-08-26 PROCEDURE — 97012 MECHANICAL TRACTION THERAPY: CPT | Performed by: PHYSICAL THERAPIST

## 2019-08-26 NOTE — PROGRESS NOTES
Physical Therapy Daily Progress Note    Visit # : 3  Carson Crespo reports: he is feeling better, but he is still having the pain in the middle of his back/shoulder blade area.      Subjective     Objective   See Exercise, Manual, and Modality Logs for complete treatment.     Moderately tender to palpation over the mid thoracic PS, MT, and rhomboid area with palpation        Assessment & Plan     Assessment  Assessment details: Pt is responding to treatment with increased tolerance to ICT and pt was able to perform stretches/strengthening exercises with minimal discomfort.  Did not do KT today secondary to pt wanting to see how it did without it.  Issued and instructed pt in a home tens unit.  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  Will continue to see pt 2x/week for stretching, strengthening, and modalities PRN.         Progress per Plan of Care           Manual Therapy:         mins  65002;  Therapeutic Exercise:    20     mins  03346;     Neuromuscular Alex:        mins  71737;    Therapeutic Activity:          mins  53647;     Gait Training:           mins  22361;     Ultrasound:          mins  67234;    Electrical Stimulation:    15     mins  43950 ( );  Dry Needling          mins self-pay  Cervical Traction          15 mins 91317     Timed Treatment:  20    mins   Total Treatment:     65   mins    Selene Kang, PT  Physical Therapist

## 2019-09-12 ENCOUNTER — TREATMENT (OUTPATIENT)
Dept: PHYSICAL THERAPY | Facility: CLINIC | Age: 39
End: 2019-09-12

## 2019-09-12 DIAGNOSIS — R52 PAIN AGGRAVATED BY LIFTING: ICD-10-CM

## 2019-09-12 DIAGNOSIS — M89.8X1 PAIN OF RIGHT SCAPULA: ICD-10-CM

## 2019-09-12 DIAGNOSIS — M89.8X1 PAIN OF RIGHT SCAPULA: Primary | ICD-10-CM

## 2019-09-12 DIAGNOSIS — R26.89 DECREASED FUNCTIONAL MOBILITY: ICD-10-CM

## 2019-09-12 PROCEDURE — 97014 ELECTRIC STIMULATION THERAPY: CPT | Performed by: PHYSICAL THERAPIST

## 2019-09-12 PROCEDURE — 97110 THERAPEUTIC EXERCISES: CPT | Performed by: PHYSICAL THERAPIST

## 2019-09-12 PROCEDURE — 97012 MECHANICAL TRACTION THERAPY: CPT | Performed by: PHYSICAL THERAPIST

## 2019-09-12 RX ORDER — DICLOFENAC SODIUM 75 MG/1
TABLET, DELAYED RELEASE ORAL
Qty: 60 TABLET | Refills: 0 | Status: SHIPPED | OUTPATIENT
Start: 2019-09-12 | End: 2019-11-04

## 2019-09-12 NOTE — PROGRESS NOTES
Physical Therapy Daily Progress Note    Visit # : 4  Carson Crespo reports: he is doing better, but he did lift some wood over the weekend that was pretty heavy.  Pt states he is not getting any right UE pain or N/T with writing or using the computer.  He still has some pain in the upper shoulder blade at times.      Subjective     Objective       Tenderness     Right Shoulder  Tenderness in the medial scapula.      See Exercise, Manual, and Modality Logs for complete treatment.       Assessment & Plan     Assessment  Assessment details: Pt is progressing well with therapy.  Pt with decreasing right UE and scapular pain since last treatment.  Pt tolerated all stretches and stabilization exercises without pain.  Will continue to see pt 1-2x/week for strengthening, stretching, and modalities PRN for pain.          Progress per Plan of Care           Manual Therapy:         mins  19037;  Therapeutic Exercise:   20      mins  72268;     Neuromuscular Alex:        mins  50830;    Therapeutic Activity:          mins  51328;     Gait Training:           mins  36236;     Ultrasound:          mins  34119;    Electrical Stimulation:    15     mins  47321 ( );  Dry Needling          mins self-pay  Cervical Traction          15 mins 90699    Timed Treatment:  20    mins   Total Treatment:    65    mins    Selene Kang, PT  Physical Therapist

## 2019-09-18 ENCOUNTER — TREATMENT (OUTPATIENT)
Dept: PHYSICAL THERAPY | Facility: CLINIC | Age: 39
End: 2019-09-18

## 2019-09-18 DIAGNOSIS — M89.8X1 PAIN OF RIGHT SCAPULA: Primary | ICD-10-CM

## 2019-09-18 DIAGNOSIS — R26.89 DECREASED FUNCTIONAL MOBILITY: ICD-10-CM

## 2019-09-18 DIAGNOSIS — R52 PAIN AGGRAVATED BY LIFTING: ICD-10-CM

## 2019-09-18 PROCEDURE — 97012 MECHANICAL TRACTION THERAPY: CPT | Performed by: PHYSICAL THERAPIST

## 2019-09-18 PROCEDURE — 97110 THERAPEUTIC EXERCISES: CPT | Performed by: PHYSICAL THERAPIST

## 2019-09-18 PROCEDURE — 97014 ELECTRIC STIMULATION THERAPY: CPT | Performed by: PHYSICAL THERAPIST

## 2019-09-18 NOTE — PROGRESS NOTES
Physical Therapy Daily Progress Note    Visit # : 5  Carson Crespo reports: he is doing much better, but he still gets occasional pain in the shoulder blade.      Subjective     Objective   See Exercise, Manual, and Modality Logs for complete treatment.       Assessment & Plan     Assessment  Assessment details: Pt is progressing well with therapy.  Pt with decreasing right UE and scapular pain.   Pt tolerated new stabilization exercises without pain, but fatigued easily.  Will continue to see pt, but decreasing frequency to 1x/week for strengthening, stretching, and modalities PRN for pain.          Progress per Plan of Care           Manual Therapy:         mins  81801;  Therapeutic Exercise:   20      mins  34646;     Neuromuscular Alex:        mins  23243;    Therapeutic Activity:          mins  53411;     Gait Training:           mins  21511;     Ultrasound:          mins  15327;    Electrical Stimulation:   15      mins  49088 ( );  Dry Needling          mins self-pay  Cervical Traction          15 mins 25320     Timed Treatment:  20    mins   Total Treatment:    65    mins    Selene Kang, PT  Physical Therapist

## 2019-09-23 ENCOUNTER — TREATMENT (OUTPATIENT)
Dept: PHYSICAL THERAPY | Facility: CLINIC | Age: 39
End: 2019-09-23

## 2019-09-23 DIAGNOSIS — M89.8X1 PAIN OF RIGHT SCAPULA: Primary | ICD-10-CM

## 2019-09-23 DIAGNOSIS — R52 PAIN AGGRAVATED BY LIFTING: ICD-10-CM

## 2019-09-23 DIAGNOSIS — R26.89 DECREASED FUNCTIONAL MOBILITY: ICD-10-CM

## 2019-09-23 PROCEDURE — 97110 THERAPEUTIC EXERCISES: CPT | Performed by: PHYSICAL THERAPIST

## 2019-09-23 PROCEDURE — 97014 ELECTRIC STIMULATION THERAPY: CPT | Performed by: PHYSICAL THERAPIST

## 2019-09-23 PROCEDURE — 97012 MECHANICAL TRACTION THERAPY: CPT | Performed by: PHYSICAL THERAPIST

## 2019-09-23 NOTE — PROGRESS NOTES
Physical Therapy Daily Progress Note/Reassessment       Visit # : 6  Carson Crespo reports: pt rates his upper shoulder/scapula pain a 3/10 on a daily average.  Pt states it is bothering him more today from using the back hole at work for 2 hours.  Pt states his pain is not constant and denies having any increased right hand or arm pain.   Pt states he is able to perform his work and daily activities 80% better since his initial visit.      Subjective     Objective       Palpation     Additional Palpation Details  Right  T 6-T7 PS-FRS right-moderate tenderness     Pt with minimal right UT, levator, scalene, and SCM tightness  Pt with mild right MT/rhomboid tightness     Strength/Myotome Testing     Left Shoulder     Planes of Motion   Flexion: 5   Abduction: 5   External rotation at 0°: 5   Internal rotation at 0°: 5     Isolated Muscles   Biceps: 5   Supraspinatus: 5   Triceps: 5     Right Shoulder     Planes of Motion   Flexion: 4+   Abduction: 5   External rotation at 0°: 5   Internal rotation at 0°: 5     Isolated Muscles   Biceps: 5   Supraspinatus: 5   Triceps: 5      See Exercise, Manual, and Modality Logs for complete treatment.     Pt received a thoracic manipulation at [facility].        Assessment & Plan     Assessment  Assessment details: Pt is doing very well with therapy.  Pt has improved strength, decreased pain, and improving cervical and thoracic flexibility.  Pt has met 5/8 goals and tolerated the thoracic manipulation without pain and improved alignment.  Will continue to see pt once per week for stretching, strengthening, and modalities PRN for pain.          Plan Goals: STGs: 2-3 weeks  1.  Decrease right thoracic and shoulder pain to a 7/10 at it's worst  MET  2.  Decrease right cervical musculature tightness and tenderness to mild with palpation MET  3.  Increase pt's ability to use his right UE at work and during his ADLs by 50% MET    LTGs: 4-8 weeks  1.  Decrease  right thoracic and shoulder pain to a 3/10 at it's worst  MET  2.  Pt is independent with HEP  3.  Decrease right cervical musculature tightness and tenderness to minimal with palpation  MET  4.  Pt is able to work and perform his normal daily activities by 90% with < or = 3/10 pain.  PROGRESSING   5.  Increase right shoulder strength to a 5/5 without pain.  PROGRESSING     Progress per Plan of Care     Manual Therapy:         mins  01236;  Therapeutic Exercise:   20      mins  71275;     Neuromuscular Alex:        mins  11847;    Therapeutic Activity:          mins  63476;     Gait Training:           mins  64942;     Ultrasound:          mins  86712;    Electrical Stimulation:    15     mins  86440 ( );  Dry Needling          mins self-pay  Cervical Traction           15 mins 18067     Timed Treatment:  20    mins   Total Treatment:     70   mins    Selene Kang, PT  Physical Therapist

## 2019-10-07 ENCOUNTER — TREATMENT (OUTPATIENT)
Dept: PHYSICAL THERAPY | Facility: CLINIC | Age: 39
End: 2019-10-07

## 2019-10-07 DIAGNOSIS — M89.8X1 PAIN OF RIGHT SCAPULA: Primary | ICD-10-CM

## 2019-10-07 DIAGNOSIS — R52 PAIN AGGRAVATED BY LIFTING: ICD-10-CM

## 2019-10-07 DIAGNOSIS — R26.89 DECREASED FUNCTIONAL MOBILITY: ICD-10-CM

## 2019-10-07 PROCEDURE — 97035 APP MDLTY 1+ULTRASOUND EA 15: CPT | Performed by: PHYSICAL THERAPIST

## 2019-10-07 PROCEDURE — 97012 MECHANICAL TRACTION THERAPY: CPT | Performed by: PHYSICAL THERAPIST

## 2019-10-07 PROCEDURE — 97110 THERAPEUTIC EXERCISES: CPT | Performed by: PHYSICAL THERAPIST

## 2019-10-07 NOTE — PROGRESS NOTES
Physical Therapy Daily Progress Note/Reassessment     Visit # : 7  Carson Crespo reports: he is feeling much better, but he still has one spot near his shoulder blade that is bothering him when he is lifting.  Pt rates his pain a 0-1/10 and describes it as uncomfortable to sharp.      Subjective     Objective       Tenderness   Cervical Spine   Tenderness in the right scapula.     Additional Tenderness Details  Medial border of the right scapula at T4-T6    Active Range of Motion   Cervical/Thoracic Spine   Cervical    Left lateral flexion: 35 degrees   Right lateral flexion: 31 degrees   Left rotation: 63 degrees   Right rotation: 66 degrees     Strength/Myotome Testing     Left Shoulder     Planes of Motion   Flexion: 5   Abduction: 5   External rotation at 0°: 5   Internal rotation at 0°: 5     Right Shoulder     Planes of Motion   Flexion: 5   Abduction: 5   External rotation at 0°: 5   Internal rotation at 0°: 5     Left Elbow   Flexion: 5  Extension: 5    Right Elbow   Flexion: 5  Extension: 5     See Exercise, Manual, and Modality Logs for complete treatment.       Assessment & Plan     Assessment  Assessment details: Pt tolerated treatment well with decreasing pain, tenderness, good CROM, and good B shoulder strength.  Pt's pain is now more isolated to the left medial border of scapula, so changed treatment to US. Pt has accomplished all STGs and 4/5 LTGs.   Pt is doing well with all stretches and exercises.  Will continue to see pt once per week for one more week with plans to discharge after next week if symptoms continue to improve.          Other           Manual Therapy:         mins  21019;  Therapeutic Exercise:   15      mins  57511;     Neuromuscular Alex:        mins  67096;    Therapeutic Activity:          mins  87521;     Gait Training:           mins  96314;     Ultrasound:    8      mins  97848;    Electrical Stimulation:         mins  23780 ( );  Dry Needling          mins  self-pay  Cervical Traction          15 mins 67315     Timed Treatment:  23    mins   Total Treatment:     69   mins    Selene Kang, PT  Physical Therapist

## 2019-10-15 ENCOUNTER — TREATMENT (OUTPATIENT)
Dept: PHYSICAL THERAPY | Facility: CLINIC | Age: 39
End: 2019-10-15

## 2019-10-15 DIAGNOSIS — R52 PAIN AGGRAVATED BY LIFTING: ICD-10-CM

## 2019-10-15 DIAGNOSIS — M89.8X1 PAIN OF RIGHT SCAPULA: Primary | ICD-10-CM

## 2019-10-15 DIAGNOSIS — R26.89 DECREASED FUNCTIONAL MOBILITY: ICD-10-CM

## 2019-10-15 PROCEDURE — 97012 MECHANICAL TRACTION THERAPY: CPT | Performed by: PHYSICAL THERAPIST

## 2019-10-15 PROCEDURE — 97140 MANUAL THERAPY 1/> REGIONS: CPT | Performed by: PHYSICAL THERAPIST

## 2019-10-15 PROCEDURE — 97035 APP MDLTY 1+ULTRASOUND EA 15: CPT | Performed by: PHYSICAL THERAPIST

## 2019-10-15 NOTE — PROGRESS NOTES
Physical Therapy Daily Progress Note    Visit # : 8  Carson Crespo reports: he is doing pretty good, but he had to use the weed eater this weekend and his shoulder blade is hurting him more today.  Pt rates the pain a 2/10.      Subjective     Objective       Palpation     Right Tenderness of the lower trapezius, middle trapezius and rhomboids.     Additional Palpation Details  Pt with moderate point tenderness over the right T7-T10 PS with a facet dysfunction.       See Exercise, Manual, and Modality Logs for complete treatment.       Assessment & Plan     Assessment  Assessment details: Pt is showing signs of improvement, but still has isolated tenderness over the right medial border of scapula with a thoracic facet dysfunction at T7-T10.  Attempted to do a regional manipulation at T7-T10, but could not get the segment to adjust so did an MET to restore normal alignment.  Pt with good thoracic facet alignment after treatment and pt reported less pain.  Pt will be out of town next week, so will reassess pt's symptoms once pt returns from his vacation.  Plan for possible discharge.          Anticipate DC next Visit           Manual Therapy:    8     mins  32410;  Therapeutic Exercise:   2      mins  90715;     Neuromuscular Alex:        mins  63897;    Therapeutic Activity:          mins  43686;     Gait Training:           mins  25301;     Ultrasound:     8     mins  82574;    Electrical Stimulation:         mins  83963 ( );  Dry Needling          mins self-pay  Cervical Traction          15 mins 38915     Timed Treatment:   18   mins   Total Treatment:     51   mins    Selene Kang, PT  Physical Therapist

## 2019-11-04 ENCOUNTER — OFFICE VISIT (OUTPATIENT)
Dept: FAMILY MEDICINE CLINIC | Facility: CLINIC | Age: 39
End: 2019-11-04

## 2019-11-04 ENCOUNTER — HOSPITAL ENCOUNTER (OUTPATIENT)
Dept: GENERAL RADIOLOGY | Facility: HOSPITAL | Age: 39
Discharge: HOME | End: 2019-11-04

## 2019-11-04 ENCOUNTER — HOSPITAL ENCOUNTER (OUTPATIENT)
Dept: GENERAL RADIOLOGY | Facility: HOSPITAL | Age: 39
Discharge: HOME | End: 2019-11-04
Admitting: PHYSICIAN ASSISTANT

## 2019-11-04 VITALS
RESPIRATION RATE: 16 BRPM | HEIGHT: 72 IN | DIASTOLIC BLOOD PRESSURE: 84 MMHG | WEIGHT: 192 LBS | SYSTOLIC BLOOD PRESSURE: 122 MMHG | OXYGEN SATURATION: 98 % | HEART RATE: 54 BPM | BODY MASS INDEX: 26.01 KG/M2 | TEMPERATURE: 98.4 F

## 2019-11-04 DIAGNOSIS — M54.2 CHRONIC NECK PAIN: Primary | ICD-10-CM

## 2019-11-04 DIAGNOSIS — G89.29 CHRONIC BILATERAL THORACIC BACK PAIN: ICD-10-CM

## 2019-11-04 DIAGNOSIS — G89.29 CHRONIC NECK PAIN: ICD-10-CM

## 2019-11-04 DIAGNOSIS — M54.6 CHRONIC BILATERAL THORACIC BACK PAIN: ICD-10-CM

## 2019-11-04 DIAGNOSIS — M54.2 CHRONIC NECK PAIN: ICD-10-CM

## 2019-11-04 DIAGNOSIS — M89.8X1 PAIN OF RIGHT SCAPULA: ICD-10-CM

## 2019-11-04 DIAGNOSIS — G89.29 CHRONIC NECK PAIN: Primary | ICD-10-CM

## 2019-11-04 PROBLEM — J06.9 ACUTE URI: Status: RESOLVED | Noted: 2019-02-15 | Resolved: 2019-11-04

## 2019-11-04 PROBLEM — J01.00 ACUTE MAXILLARY SINUSITIS: Status: RESOLVED | Noted: 2019-04-26 | Resolved: 2019-11-04

## 2019-11-04 PROCEDURE — 72072 X-RAY EXAM THORAC SPINE 3VWS: CPT

## 2019-11-04 PROCEDURE — 72040 X-RAY EXAM NECK SPINE 2-3 VW: CPT

## 2019-11-04 PROCEDURE — 99213 OFFICE O/P EST LOW 20 MIN: CPT | Performed by: PHYSICIAN ASSISTANT

## 2019-11-04 RX ORDER — INFLUENZA A VIRUS A/SINGAPORE/GP1908/2015 IVR-180A (H1N1) ANTIGEN (PROPIOLACTONE INACTIVATED), INFLUENZA A VIRUS A/SINGAPORE/INFIMH-16-0019/2016 IVR-186 (H3N2) ANTIGEN (PROPIOLACTONE INACTIVATED), INFLUENZA B VIRUS B/MARYLAND/15/2016 ANTIGEN (PROPIOLACTONE INACTIVATED), AND INFLUENZA B VIRUS B/PHUKET/3073/2013 BVR-1B ANTIGEN (PROPIOLACTONE INACTIVATED) 15; 15; 15; 15 UG/.5ML; UG/.5ML; UG/.5ML; UG/.5ML
INJECTION, SUSPENSION INTRAMUSCULAR
COMMUNITY
Start: 2019-10-12 | End: 2020-06-05

## 2019-11-04 RX ORDER — METHOCARBAMOL 750 MG/1
750 TABLET, FILM COATED ORAL 3 TIMES DAILY
Qty: 30 TABLET | Refills: 0 | Status: SHIPPED | OUTPATIENT
Start: 2019-11-04 | End: 2020-11-24

## 2019-11-04 RX ORDER — DICLOFENAC SODIUM 75 MG/1
75 TABLET, DELAYED RELEASE ORAL 2 TIMES DAILY
Qty: 60 TABLET | Refills: 6 | Status: SHIPPED | OUTPATIENT
Start: 2019-11-04 | End: 2020-11-24

## 2019-11-04 NOTE — PROGRESS NOTES
Subjective   Carson Crespo is a 39 y.o. male presents for   Chief Complaint   Patient presents with   • Neck Pain   • Back Pain       History of Present Illness     Carson is a 39-year-old male who presents with neck and thoracic back pain off and on for the past several months.  He was seen in office on August 12, 2019 with right scapular pain.  He has been doing physical therapy off and on since this time at [facility] for this.  States his right scapula is feeling better.  He has had cervical neck pain off and on for the past several months.  Describes the pain as a shooting pain that goes down his spine.  He does have some thoracic spine pain as well.  The pain comes and goes.  States the right side of his thoracic spine is worse than left.  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  Denied any radiation of pain down the arms.  He has not had a headache, dizziness, vision changes or sleep disturbances.  States the diclofenac has helped slightly.  He has a TENS unit that he uses approximately once to 4 times weekly.  He has tried traction which did help some discomfort as well.  He has seen a chiropractor off and on but has not seen them recently.    The following portions of the patient's history were reviewed and updated as appropriate: allergies, current medications, past family history, past medical history, past social history, past surgical history and problem list.    Review of Systems   Constitutional: Negative.    HENT: Negative.    Eyes: Negative.    Respiratory: Negative.  Negative for cough, shortness of breath and wheezing.    Cardiovascular: Negative.  Negative for chest pain, palpitations and leg swelling.   Gastrointestinal: Negative.    Endocrine: Negative.    Genitourinary: Negative.    Musculoskeletal: Positive for back pain and neck pain. Negative for neck  "stiffness.   Skin: Negative.    Allergic/Immunologic: Negative.    Neurological: Negative.  Negative for dizziness, weakness, light-headedness, numbness and headaches.   Hematological: Negative.    Psychiatric/Behavioral: Negative.  Negative for sleep disturbance.   All other systems reviewed and are negative.        Vitals:    11/04/19 0731   BP: 122/84   Pulse: 54   Resp: 16   Temp: 98.4 °F (36.9 °C)   SpO2: 98%   Weight: 87.1 kg (192 lb)   Height: 182.9 cm (72\")     Wt Readings from Last 3 Encounters:   11/04/19 87.1 kg (192 lb)   08/12/19 84.4 kg (186 lb)   07/09/19 85.3 kg (188 lb)     BP Readings from Last 3 Encounters:   11/04/19 122/84   08/12/19 142/86   07/09/19 130/80     Social History     Socioeconomic History   • Marital status:      Spouse name: Not on file   • Number of children: Not on file   • Years of education: Not on file   • Highest education level: Not on file   Tobacco Use   • Smoking status: Former Smoker   • Smokeless tobacco: Current User   Substance and Sexual Activity   • Alcohol use: Yes     Alcohol/week: 2.4 oz     Types: 4 Cans of beer per week     Comment: 3   • Drug use: Defer   • Sexual activity: Defer       Allergies   Allergen Reactions   • Bee Pollen Swelling   • Penicillins Swelling       Body mass index is 26.04 kg/m².    Objective   Physical Exam   Constitutional: He is oriented to person, place, and time. Vital signs are normal. He appears well-developed and well-nourished.   Neck: Trachea normal, normal range of motion, full passive range of motion without pain and phonation normal. Neck supple. Spinous process tenderness and muscular tenderness present. No tracheal tenderness present. No neck rigidity. No edema, no erythema and normal range of motion present.       Cardiovascular: Normal rate, regular rhythm, S1 normal, S2 normal, normal heart sounds and normal pulses.   No murmur heard.  Pulmonary/Chest: Effort normal and breath sounds normal.   Abdominal: Soft. " Normal appearance, normal aorta and bowel sounds are normal. There is no hepatomegaly. There is no tenderness.   Musculoskeletal:        Thoracic back: He exhibits tenderness, bony tenderness, pain and spasm. He exhibits normal range of motion.        Back:    Good bilateral muscle strength noted of the upper extremities.  2+ distal pulses noted.   Neurological: He is alert and oriented to person, place, and time. He has normal strength. No sensory deficit.   Skin: Skin is warm, dry and intact. Capillary refill takes less than 2 seconds.   Psychiatric: He has a normal mood and affect. His speech is normal and behavior is normal. Judgment and thought content normal. Cognition and memory are normal.       Assessment/Plan   Carson was seen today for neck pain and back pain.    Diagnoses and all orders for this visit:    Chronic neck pain  -     XR Spine Cervical 3 View; Future  -     methocarbamol (ROBAXIN) 750 MG tablet; Take 1 tablet by mouth 3 (Three) Times a Day. For right shoulder/scapula pain  -     diclofenac (VOLTAREN) 75 MG EC tablet; Take 1 tablet by mouth 2 (Two) Times a Day.    Chronic bilateral thoracic back pain  -     XR Spine Thoracic 3 View; Future  -     methocarbamol (ROBAXIN) 750 MG tablet; Take 1 tablet by mouth 3 (Three) Times a Day. For right shoulder/scapula pain  -     diclofenac (VOLTAREN) 75 MG EC tablet; Take 1 tablet by mouth 2 (Two) Times a Day.    Pain of right scapula      1.  New neck pain: He has been having neck pain off and on for the past several months.  This is chronic in nature.  We will do a cervical spine x-ray at the [place] facility today.  Will consider MRI of cervical spine in future as warranted.  I have refilled his diclofenac and Robaxin medications to pharmacy.  I will update physical therapy as well.  2.  New bilateral thoracic back pain: He has been having thoracic back pain off and on for the past several months.  He will have a thoracic spine x-ray at the [place]  facility today.  I will give physical therapy update as well.  Will consider MRI of thoracic spine in future if warranted.  I have sent a refill of his diclofenac and Robaxin to pharmacy.  He will continue use his TENS unit as needed.    [facility]  [address] [place] [id number]  Dept: [phone]  Dept Fax: [phone]  Loc: [phone]  Loc Fax: [phone]

## 2019-11-14 ENCOUNTER — HOSPITAL ENCOUNTER (OUTPATIENT)
Dept: MRI IMAGING | Facility: HOSPITAL | Age: 39
End: 2019-11-14

## 2019-11-14 ENCOUNTER — APPOINTMENT (OUTPATIENT)
Dept: MRI IMAGING | Facility: HOSPITAL | Age: 39
End: 2019-11-14

## 2019-11-26 ENCOUNTER — HOSPITAL ENCOUNTER (OUTPATIENT)
Dept: MRI IMAGING | Facility: HOSPITAL | Age: 39
Discharge: HOME | End: 2019-11-26
Admitting: PHYSICIAN ASSISTANT

## 2019-11-26 ENCOUNTER — HOSPITAL ENCOUNTER (OUTPATIENT)
Dept: MRI IMAGING | Facility: HOSPITAL | Age: 39
Discharge: HOME | End: 2019-11-26

## 2019-11-26 DIAGNOSIS — M54.6 CHRONIC BILATERAL THORACIC BACK PAIN: ICD-10-CM

## 2019-11-26 DIAGNOSIS — G89.29 CHRONIC NECK PAIN: ICD-10-CM

## 2019-11-26 DIAGNOSIS — G89.29 CHRONIC BILATERAL THORACIC BACK PAIN: ICD-10-CM

## 2019-11-26 DIAGNOSIS — M54.2 CHRONIC NECK PAIN: ICD-10-CM

## 2019-11-26 PROCEDURE — 72141 MRI NECK SPINE W/O DYE: CPT

## 2019-11-26 PROCEDURE — 72146 MRI CHEST SPINE W/O DYE: CPT

## 2020-04-09 RX ORDER — OMEPRAZOLE 20 MG/1
20 CAPSULE, DELAYED RELEASE ORAL EVERY MORNING
Qty: 90 CAPSULE | Refills: 0 | Status: SHIPPED | OUTPATIENT
Start: 2020-04-09 | End: 2020-07-02

## 2020-05-12 DIAGNOSIS — A60.00 GENITAL HERPES SIMPLEX, UNSPECIFIED SITE: ICD-10-CM

## 2020-05-13 RX ORDER — VALACYCLOVIR HYDROCHLORIDE 500 MG/1
500 TABLET, FILM COATED ORAL 2 TIMES DAILY
Qty: 21 TABLET | Refills: 3 | Status: SHIPPED | OUTPATIENT
Start: 2020-05-13 | End: 2021-06-28

## 2020-06-05 ENCOUNTER — OFFICE VISIT (OUTPATIENT)
Dept: FAMILY MEDICINE CLINIC | Facility: CLINIC | Age: 40
End: 2020-06-05

## 2020-06-05 VITALS
HEIGHT: 72 IN | TEMPERATURE: 97.8 F | OXYGEN SATURATION: 98 % | SYSTOLIC BLOOD PRESSURE: 128 MMHG | BODY MASS INDEX: 26.28 KG/M2 | DIASTOLIC BLOOD PRESSURE: 68 MMHG | RESPIRATION RATE: 16 BRPM | WEIGHT: 194 LBS | HEART RATE: 69 BPM

## 2020-06-05 DIAGNOSIS — H00.014 HORDEOLUM EXTERNUM OF LEFT UPPER EYELID: ICD-10-CM

## 2020-06-05 DIAGNOSIS — H57.89 EYE SWELLING, BILATERAL: ICD-10-CM

## 2020-06-05 DIAGNOSIS — H00.011 HORDEOLUM EXTERNUM OF RIGHT UPPER EYELID: Primary | ICD-10-CM

## 2020-06-05 PROCEDURE — 99213 OFFICE O/P EST LOW 20 MIN: CPT | Performed by: PHYSICIAN ASSISTANT

## 2020-06-05 RX ORDER — METHYLPREDNISOLONE ACETATE 40 MG/ML
40 INJECTION, SUSPENSION INTRA-ARTICULAR; INTRALESIONAL; INTRAMUSCULAR; SOFT TISSUE ONCE
Status: COMPLETED | OUTPATIENT
Start: 2020-06-05 | End: 2020-06-05

## 2020-06-05 RX ADMIN — METHYLPREDNISOLONE ACETATE 40 MG: 40 INJECTION, SUSPENSION INTRA-ARTICULAR; INTRALESIONAL; INTRAMUSCULAR; SOFT TISSUE at 13:53

## 2020-06-05 NOTE — PROGRESS NOTES
Subjective   Carson Crespo is a 39 y.o. male presents for   Chief Complaint   Patient presents with   • Eye Problem     bilat eyes       History of Present Illness     Carson is a 39-year-old male who presents with right lateral upper eye swelling, and itching for the past few days.  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  He has noticed that he may have a stye on his left upper eyelid.  Denied any fevers, chills, eye discharge, headaches, ear pain, sinus pressure, rhinorrhea, sore throat.  He tried to use his wife's eyedrops last night without relief of discomfort.  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  He has been using over-the-counter Zyrtec for his allergies.    The following portions of the patient's history were reviewed and updated as appropriate: allergies, current medications, past family history, past medical history, past social history, past surgical history and problem list.    Review of Systems   Constitutional: Negative.  Negative for chills, fatigue and fever.   HENT: Negative.  Negative for congestion, ear discharge, ear pain, postnasal drip, rhinorrhea, sinus pressure, sinus pain, sneezing and sore throat.    Eyes: Positive for itching and visual disturbance. Negative for photophobia, pain, discharge and redness.   Respiratory: Negative.  Negative for cough, chest tightness, shortness of breath and wheezing.    Cardiovascular: Negative.  Negative for chest pain, palpitations and leg swelling.   Gastrointestinal: Negative.  Negative for abdominal pain, diarrhea, nausea and vomiting.   Endocrine: Negative.    Genitourinary: Negative.    Musculoskeletal: Negative.    Skin: Negative.    Allergic/Immunologic: Negative.    Neurological: Negative.  Negative for dizziness, light-headedness and headaches.   Hematological: Negative.    Psychiatric/Behavioral: Negative.  Negative for sleep disturbance.   All other systems reviewed and are negative.        Vitals:     "06/05/20 1327   BP: 128/68   Pulse: 69   Resp: 16   Temp: 97.8 °F (36.6 °C)   TempSrc: Oral   SpO2: 98%   Weight: 88 kg (194 lb)   Height: 182.9 cm (72\")     Wt Readings from Last 3 Encounters:   06/05/20 88 kg (194 lb)   02/13/20 88.5 kg (195 lb)   11/14/19 113 kg (250 lb)     BP Readings from Last 3 Encounters:   06/05/20 128/68   02/13/20 139/95   11/04/19 122/84     Social History     Socioeconomic History   • Marital status:      Spouse name: Not on file   • Number of children: Not on file   • Years of education: Not on file   • Highest education level: Not on file   Tobacco Use   • Smoking status: Former Smoker   • Smokeless tobacco: Current User   Substance and Sexual Activity   • Alcohol use: Yes     Alcohol/week: 4.0 standard drinks     Types: 4 Cans of beer per week     Comment: 3   • Drug use: Defer   • Sexual activity: Defer       Allergies   Allergen Reactions   • Bee Pollen Swelling   • Penicillins Swelling       Body mass index is 26.31 kg/m².    Objective   Physical Exam   Constitutional: He is oriented to person, place, and time. Vital signs are normal. He appears well-developed and well-nourished. Face mask in place.   Sitting on exam table wearing a fabric facial mask   HENT:   Head: Normocephalic and atraumatic.   Right Ear: Hearing, tympanic membrane, external ear and ear canal normal.   Left Ear: Hearing, tympanic membrane, external ear and ear canal normal.   Nose: Nose normal. Right sinus exhibits no maxillary sinus tenderness and no frontal sinus tenderness. Left sinus exhibits no maxillary sinus tenderness and no frontal sinus tenderness.   Mouth/Throat: Uvula is midline, oropharynx is clear and moist and mucous membranes are normal.   Eyes: Pupils are equal, round, and reactive to light. Conjunctivae and EOM are normal. Right eye exhibits hordeolum. Right eye exhibits no discharge. Left eye exhibits hordeolum. Left eye exhibits no discharge. Right conjunctiva is not injected. Left " conjunctiva is not injected.   Bilateral upper and lower lids are slightly swollen and puffy.   Neck: Trachea normal and phonation normal. Neck supple. No tracheal tenderness present. No tracheal deviation and no edema present.   Cardiovascular: Normal rate, regular rhythm, S1 normal, S2 normal, normal heart sounds, intact distal pulses and normal pulses.   No murmur heard.  Pulmonary/Chest: Effort normal and breath sounds normal.   Abdominal: Soft. Normal appearance, normal aorta and bowel sounds are normal. There is no hepatomegaly. There is no tenderness.   Lymphadenopathy:     He has no cervical adenopathy.   Neurological: He is alert and oriented to person, place, and time. He has normal reflexes.   Skin: Skin is warm, dry and intact. Capillary refill takes less than 2 seconds.   Psychiatric: He has a normal mood and affect. His speech is normal and behavior is normal. Judgment and thought content normal. Cognition and memory are normal.      I was wearing surgical mask during the entire office visit encounter.      Assessment/Plan   Carson was seen today for eye problem.    Diagnoses and all orders for this visit:    Hordeolum externum of right upper eyelid  -     ciprofloxacin (Ciloxan) 0.3 % ophthalmic ointment; Apply  to eye(s) as directed by provider 2 (Two) Times a Day. Apply small amount to affected area twice daily as needed    Hordeolum externum of left upper eyelid  -     ciprofloxacin (Ciloxan) 0.3 % ophthalmic ointment; Apply  to eye(s) as directed by provider 2 (Two) Times a Day. Apply small amount to affected area twice daily as needed    Eye swelling, bilateral  -     methylPREDNISolone acetate (DEPO-medrol) injection 40 mg      Mr. Carson Crespo was seen in office today with bilateral Hordeolum and swelling of upper eyelids.  Carson will have a 40 mg IM injection of Depo-Medrol at office visit today.  I have sent Ciloxan ophthalmic ointment to pharmacy.  He will use as directed.  Have stressed the  importance of using good handwashing.  He may continue his Zyrtec at home for allergies.  I have asked him to use warm compresses to the area as well.  Carson will return to office if no improvement.    BORIS [facility]  [address] [place] [id number]  Dept: [phone]  Dept Fax: [phone]  Loc: [phone]  Loc Fax: [phone]

## 2020-07-02 RX ORDER — OMEPRAZOLE 20 MG/1
CAPSULE, DELAYED RELEASE ORAL
Qty: 90 CAPSULE | Refills: 0 | Status: SHIPPED | OUTPATIENT
Start: 2020-07-02 | End: 2020-10-02

## 2020-10-02 RX ORDER — OMEPRAZOLE 20 MG/1
20 CAPSULE, DELAYED RELEASE ORAL EVERY MORNING
Qty: 90 CAPSULE | Refills: 0 | Status: SHIPPED | OUTPATIENT
Start: 2020-10-02 | End: 2020-12-31

## 2020-11-24 ENCOUNTER — OFFICE VISIT (OUTPATIENT)
Dept: FAMILY MEDICINE CLINIC | Facility: CLINIC | Age: 40
End: 2020-11-24

## 2020-11-24 VITALS
TEMPERATURE: 97.7 F | HEART RATE: 78 BPM | OXYGEN SATURATION: 98 % | HEIGHT: 72 IN | WEIGHT: 194 LBS | RESPIRATION RATE: 16 BRPM | SYSTOLIC BLOOD PRESSURE: 130 MMHG | BODY MASS INDEX: 26.28 KG/M2 | DIASTOLIC BLOOD PRESSURE: 80 MMHG

## 2020-11-24 DIAGNOSIS — L23.7 ALLERGIC CONTACT DERMATITIS DUE TO PLANTS, EXCEPT FOOD: Primary | ICD-10-CM

## 2020-11-24 PROCEDURE — 96372 THER/PROPH/DIAG INJ SC/IM: CPT | Performed by: NURSE PRACTITIONER

## 2020-11-24 PROCEDURE — 99213 OFFICE O/P EST LOW 20 MIN: CPT | Performed by: NURSE PRACTITIONER

## 2020-11-24 RX ORDER — PREDNISONE 10 MG/1
TABLET ORAL
Qty: 27 TABLET | Refills: 0 | Status: SHIPPED | OUTPATIENT
Start: 2020-11-24 | End: 2021-01-28

## 2020-11-24 RX ORDER — METHYLPREDNISOLONE ACETATE 80 MG/ML
60 INJECTION, SUSPENSION INTRA-ARTICULAR; INTRALESIONAL; INTRAMUSCULAR; SOFT TISSUE ONCE
Status: COMPLETED | OUTPATIENT
Start: 2020-11-24 | End: 2020-11-24

## 2020-11-24 RX ORDER — TRIAMCINOLONE ACETONIDE 1 MG/G
CREAM TOPICAL 2 TIMES DAILY
Qty: 28.4 G | Refills: 0 | Status: SHIPPED | OUTPATIENT
Start: 2020-11-24 | End: 2021-09-02

## 2020-11-24 RX ADMIN — METHYLPREDNISOLONE ACETATE 60 MG: 80 INJECTION, SUSPENSION INTRA-ARTICULAR; INTRALESIONAL; INTRAMUSCULAR; SOFT TISSUE at 13:17

## 2020-11-24 NOTE — PROGRESS NOTES
"Subjective      Chief Complaint   Patient presents with   • Poison [place]       Carson Crespo is a 40 y.o. male who presents for evaluation of a rash involving the chest, trunk and back. Rash started 3 days ago. Lesions are red, and raised in texture. Rash has not changed over time. Rash is pruritic. Not painful.  Associated symptoms: none. Patient denies: abdominal pain, cough and fever. Patient has not had contacts with similar rash. Patient has not had new exposures (soaps, lotions, laundry detergents, foods, medications, plants, insects or animals).  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  Has tried calamine lotion without improvement.    The following portions of the patient's history were reviewed and updated as appropriate: allergies, current medications, past family history, past medical history, past social history, past surgical history and problem list.    Review of Systems  A comprehensive review of systems was negative except for: Integument/breast: positive for pruritus and rash     Objective   Temp 97.7 °F (36.5 °C)   Resp 16   Ht 182.9 cm (72\")   Wt 88 kg (194 lb)   BMI 26.31 kg/m²   General:  alert, appears stated age and cooperative   Skin:  erythema noted on right upper chest, right lateral trunk and right lower back resembling poison ivy.  No blistering noted..     Assessment/Plan   contact dermatitis: plants poison ivy     Medications: steroids: Depomedrol and prednisone  and triamcinolone.  Written patient instruction given.  Follow up in 1 week. if no improvement.      Renée Salvador, APRN         "

## 2020-11-24 NOTE — PATIENT INSTRUCTIONS
Poison Ivy Dermatitis  Poison ivy dermatitis is redness and soreness of the skin caused by chemicals in the leaves of the poison ivy plant. You may have very bad itching, swelling, a rash, and blisters.  What are the causes?  · Touching a poison ivy plant.  · Touching something that has the chemical on it. This may include animals or objects that have come in contact with the plant.  What increases the risk?  · Going outdoors often in wooded or marshy areas.  · Going outdoors without wearing protective clothing, such as closed shoes, long pants, and a long-sleeved shirt.  What are the signs or symptoms?    · Skin redness.  · Very bad itching.  · A rash that often includes bumps and blisters.  ? The rash usually appears 48 hours after exposure, if you have been exposed before.  ? If this is the first time you have been exposed, the rash may not appear until a week after exposure.  · Swelling. This may occur if the reaction is very bad.  Symptoms usually last for 1-2 weeks. The first time you develop this condition, symptoms may last 3-4 weeks.  How is this treated?  This condition may be treated with:  · Hydrocortisone cream or calamine lotion to relieve itching.  · Oatmeal baths to soothe the skin.  · Medicines, such as over-the-counter antihistamine tablets.  · Oral steroid medicine for more severe reactions.  Follow these instructions at home:  Medicines  · Take or apply over-the-counter and prescription medicines only as told by your doctor.  · Use hydrocortisone cream or calamine lotion as needed to help with itching.  General instructions  · Do not scratch or rub your skin.  · Put a cold, wet cloth (cold compress) on the affected areas or take baths in cool water. This will help with itching.  · Avoid hot baths and showers.  · Take oatmeal baths as needed. Use colloidal oatmeal. You can get this at a pharmacy or grocery store. Follow the instructions on the package.  · While you have the rash, wash your clothes  right after you wear them.  · Keep all follow-up visits as told by your health care provider. This is important.  How is this prevented?    · Know what poison ivy looks like, so you can avoid it.  ? This plant has three leaves with flowering branches on a single stem.  ? The leaves are glossy.  ? The leaves have uneven edges that come to a point at the front.  · If you touch poison ivy, wash your skin with soap and water right away. Be sure to wash under your fingernails.  · When hiking or camping, wear long pants, a long-sleeved shirt, tall socks, and hiking boots. You can also use a lotion on your skin that helps to prevent contact with poison ivy.  · If you think that your clothes or outdoor gear came in contact with poison ivy, rinse them off with a garden hose before you bring them inside your house.  · When doing yard work or gardening, wear gloves, long sleeves, long pants, and boots. Wash your garden tools and gloves if they come in contact with poison ivy.  · If you think that your pet has come into contact with poison ivy, wash him or her with pet shampoo and water. Make sure to wear gloves while washing your pet.  Contact a doctor if:  · You have open sores in the rash area.  · You have more redness, swelling, or pain in the rash area.  · You have redness that spreads beyond the rash area.  · You have fluid, blood, or pus coming from the rash area.  · You have a fever.  · You have a rash over a large area of your body.  · You have a rash on your eyes, mouth, or genitals.  · Your rash does not get better after a few weeks.  Get help right away if:  · Your face swells or your eyes swell shut.  · You have trouble breathing.  · You have trouble swallowing.  These symptoms may be an emergency. Do not wait to see if the symptoms will go away. Get medical help right away. Call your local emergency services (911 in the [place] Do not drive yourself to the hospital.  Summary  · Poison ivy dermatitis is redness and  soreness of the skin caused by chemicals in the leaves of the poison ivy plant.  · You may have skin redness, very bad itching, swelling, and a rash.  · Do not scratch or rub your skin.  · Take or apply over-the-counter and prescription medicines only as told by your doctor.  This information is not intended to replace advice given to you by your health care provider. Make sure you discuss any questions you have with your health care provider.  Document Revised: 04/10/2020 Document Reviewed: 12/13/2019  Elsevier Patient Education © 2020 Elsevier Inc.

## 2020-12-01 ENCOUNTER — OFFICE VISIT (OUTPATIENT)
Dept: FAMILY MEDICINE CLINIC | Facility: CLINIC | Age: 40
End: 2020-12-01

## 2020-12-01 DIAGNOSIS — Z20.822 ENCOUNTER FOR SCREENING LABORATORY TESTING FOR COVID-19 VIRUS: ICD-10-CM

## 2020-12-01 DIAGNOSIS — J06.9 URI WITH COUGH AND CONGESTION: Primary | ICD-10-CM

## 2020-12-01 PROCEDURE — 99441 PR PHYS/QHP TELEPHONE EVALUATION 5-10 MIN: CPT | Performed by: NURSE PRACTITIONER

## 2020-12-01 RX ORDER — BROMPHENIRAMINE MALEATE, PSEUDOEPHEDRINE HYDROCHLORIDE, AND DEXTROMETHORPHAN HYDROBROMIDE 2; 30; 10 MG/5ML; MG/5ML; MG/5ML
5 SYRUP ORAL 4 TIMES DAILY PRN
Qty: 120 ML | Refills: 0 | Status: SHIPPED | OUTPATIENT
Start: 2020-12-01 | End: 2021-01-28

## 2020-12-01 NOTE — PROGRESS NOTES
Carson Crespo is a 40 y.o. who presents today for a telephone visit with complaints of cough, congestion, and SOA    You have chosen to receive care through a telephone visit. Do you consent to use a telephone visit for your medical care today? Yes    Time spent caring for the patient was 5 - 10 min.    Chief Complaint   Patient presents with   • Cough   • Shortness of Breath       Upper Respiratory Infection: Patient complains of symptoms of a URI. Symptoms include congestion, cough and sore throat. Onset of symptoms was 3 days ago, gradually worsening since that time. He also c/o achiness, congestion, no  fever, non productive cough and shortness of breath for the past 3 days .  He is drinking moderate amounts of fluids. Evaluation to date: none. Treatment to date: antihistamines and cough suppressants.  Ill contacts at home or school or work discussed.  No known Covid exposure.    The following portions of the patient's history were reviewed and updated as appropriate: allergies, current medications, past family history, past medical history, past social history, past surgical history and problem list.    A comprehensive review of systems was negative except for: Respiratory: positive for cough and SOA with chest congestion    There were no vitals filed for this visit.  Gen: Alert, congested voice and coughing during telephone visit.      Assessment/Plan   Diagnoses and all orders for this visit:    1. URI with cough and congestion (Primary)  -     brompheniramine-pseudoephedrine-DM 30-2-10 MG/5ML syrup; Take 5 mL by mouth 4 (Four) Times a Day As Needed for Cough.  Dispense: 120 mL; Refill: 0  -     COVID-19,[facility],NP/OP Swab in Transport Media or ESwab 72 HR TAT - Swab, Nasopharynx; Future    2. Encounter for screening laboratory testing for COVID-19 virus  -     COVID-19,[facility],NP/OP Swab in Transport Media or ESwab 72 HR TAT - Swab, Nasopharynx; Future             Patient Instructions   Upper Respiratory  "Infection, Adult  An upper respiratory infection (URI) affects the nose, throat, and upper air passages. URIs are caused by germs (viruses). The most common type of URI is often called \"the common cold.\"  Medicines cannot cure URIs, but you can do things at home to relieve your symptoms. URIs usually get better within 7-10 days.  Follow these instructions at home:  Activity  · Rest as needed.  · If you have a fever, stay home from work or school until your fever is gone, or until your doctor says you may return to work or school.  ? You should stay home until you cannot spread the infection anymore (you are not contagious).  ? Your doctor may have you wear a face mask so you have less risk of spreading the infection.  Relieving symptoms  · Gargle with a salt-water mixture 3-4 times a day or as needed. To make a salt-water mixture, completely dissolve ½-1 tsp of salt in 1 cup of warm water.  · Use a cool-mist humidifier to add moisture to the air. This can help you breathe more easily.  Eating and drinking    · Drink enough fluid to keep your pee (urine) pale yellow.  · Eat soups and other clear broths.  General instructions    · Take over-the-counter and prescription medicines only as told by your doctor. These include cold medicines, fever reducers, and cough suppressants.  · Do not use any products that contain nicotine or tobacco. These include cigarettes and e-cigarettes. If you need help quitting, ask your doctor.  · Avoid being where people are smoking (avoid secondhand smoke).  · Make sure you get regular shots and get the flu shot every year.  · Keep all follow-up visits as told by your doctor. This is important.  How to avoid spreading infection to others    · Wash your hands often with soap and water. If you do not have soap and water, use hand .  · Avoid touching your mouth, face, eyes, or nose.  · Cough or sneeze into a tissue or your sleeve or elbow. Do not cough or sneeze into your hand or " "into the air.  Contact a doctor if:  · You are getting worse, not better.  · You have any of these:  ? A fever.  ? Chills.  ? Brown or red mucus in your nose.  ? Yellow or brown fluid (discharge)coming from your nose.  ? Pain in your face, especially when you bend forward.  ? Swollen neck glands.  ? Pain with swallowing.  ? White areas in the back of your throat.  Get help right away if:  · You have shortness of breath that gets worse.  · You have very bad or constant:  ? Headache.  ? Ear pain.  ? Pain in your forehead, behind your eyes, and over your cheekbones (sinus pain).  ? Chest pain.  · You have long-lasting (chronic) lung disease along with any of these:  ? Wheezing.  ? Long-lasting cough.  ? Coughing up blood.  ? A change in your usual mucus.  · You have a stiff neck.  · You have changes in your:  ? Vision.  ? Hearing.  ? Thinking.  ? Mood.  Summary  · An upper respiratory infection (URI) is caused by a germ called a virus. The most common type of URI is often called \"the common cold.\"  · URIs usually get better within 7-10 days.  · Take over-the-counter and prescription medicines only as told by your doctor.  This information is not intended to replace advice given to you by your health care provider. Make sure you discuss any questions you have with your health care provider.  Document Revised: 12/26/2019 Document Reviewed: 08/10/2018  [employer] Patient Education © 2020 [employer]        Tylenol or Advil as needed for pain, fever, muscle aches  Plenty of fluids  Hand washing discussed  Off work or school note given if needed.  Warm tea for throat.  Pros and cons of antibiotic use discussed.  Instructed to notify us if symptoms worsen or do not improve.      Renée Salvador, KELLEY  Family Practice  [facility] [place]  "

## 2020-12-01 NOTE — PATIENT INSTRUCTIONS
"Upper Respiratory Infection, Adult  An upper respiratory infection (URI) affects the nose, throat, and upper air passages. URIs are caused by germs (viruses). The most common type of URI is often called \"the common cold.\"  Medicines cannot cure URIs, but you can do things at home to relieve your symptoms. URIs usually get better within 7-10 days.  Follow these instructions at home:  Activity  · Rest as needed.  · If you have a fever, stay home from work or school until your fever is gone, or until your doctor says you may return to work or school.  ? You should stay home until you cannot spread the infection anymore (you are not contagious).  ? Your doctor may have you wear a face mask so you have less risk of spreading the infection.  Relieving symptoms  · Gargle with a salt-water mixture 3-4 times a day or as needed. To make a salt-water mixture, completely dissolve ½-1 tsp of salt in 1 cup of warm water.  · Use a cool-mist humidifier to add moisture to the air. This can help you breathe more easily.  Eating and drinking    · Drink enough fluid to keep your pee (urine) pale yellow.  · Eat soups and other clear broths.  General instructions    · Take over-the-counter and prescription medicines only as told by your doctor. These include cold medicines, fever reducers, and cough suppressants.  · Do not use any products that contain nicotine or tobacco. These include cigarettes and e-cigarettes. If you need help quitting, ask your doctor.  · Avoid being where people are smoking (avoid secondhand smoke).  · Make sure you get regular shots and get the flu shot every year.  · Keep all follow-up visits as told by your doctor. This is important.  How to avoid spreading infection to others    · Wash your hands often with soap and water. If you do not have soap and water, use hand .  · Avoid touching your mouth, face, eyes, or nose.  · Cough or sneeze into a tissue or your sleeve or elbow. Do not cough or sneeze " "into your hand or into the air.  Contact a doctor if:  · You are getting worse, not better.  · You have any of these:  ? A fever.  ? Chills.  ? Brown or red mucus in your nose.  ? Yellow or brown fluid (discharge)coming from your nose.  ? Pain in your face, especially when you bend forward.  ? Swollen neck glands.  ? Pain with swallowing.  ? White areas in the back of your throat.  Get help right away if:  · You have shortness of breath that gets worse.  · You have very bad or constant:  ? Headache.  ? Ear pain.  ? Pain in your forehead, behind your eyes, and over your cheekbones (sinus pain).  ? Chest pain.  · You have long-lasting (chronic) lung disease along with any of these:  ? Wheezing.  ? Long-lasting cough.  ? Coughing up blood.  ? A change in your usual mucus.  · You have a stiff neck.  · You have changes in your:  ? Vision.  ? Hearing.  ? Thinking.  ? Mood.  Summary  · An upper respiratory infection (URI) is caused by a germ called a virus. The most common type of URI is often called \"the common cold.\"  · URIs usually get better within 7-10 days.  · Take over-the-counter and prescription medicines only as told by your doctor.  This information is not intended to replace advice given to you by your health care provider. Make sure you discuss any questions you have with your health care provider.  Document Revised: 12/26/2019 Document Reviewed: 08/10/2018  Elsevier Patient Education © 2020 Else[employer] Inc.    "

## 2020-12-02 ENCOUNTER — TELEPHONE (OUTPATIENT)
Dept: FAMILY MEDICINE CLINIC | Facility: CLINIC | Age: 40
End: 2020-12-02

## 2020-12-02 NOTE — TELEPHONE ENCOUNTER
PATIENT HAD TELEPHONE APPT WITH CHRIS STRAUSS YESTERDAY AND SHE ADVISED PATIENT TO GET TESTED FOR COVID-19. PATIENT WAS TESTED 12/1/20 AND IS AWAITING RESULTS. PATIENT NEEDS A LETTER FOR WORK. PLEASE ADVISE.     PATIENT CALL BACK: [phone]

## 2020-12-02 NOTE — TELEPHONE ENCOUNTER
Test results should be back by tomorrow.  Would need to wait for test results to be completed to determine how long note should be written for.

## 2020-12-03 ENCOUNTER — TELEPHONE (OUTPATIENT)
Dept: FAMILY MEDICINE CLINIC | Facility: CLINIC | Age: 40
End: 2020-12-03

## 2020-12-03 DIAGNOSIS — J06.9 URI WITH COUGH AND CONGESTION: Primary | ICD-10-CM

## 2020-12-03 RX ORDER — DOXYCYCLINE 100 MG/1
100 TABLET ORAL 2 TIMES DAILY
Qty: 20 TABLET | Refills: 0 | Status: SHIPPED | OUTPATIENT
Start: 2020-12-03 | End: 2021-01-28

## 2020-12-03 NOTE — TELEPHONE ENCOUNTER
I spoke with Carson on December 3, 2020 at 4 PM.  I have discussed his signs, symptoms, Covid testing and new CDC recommendations. Carson voiced understanding.

## 2020-12-03 NOTE — TELEPHONE ENCOUNTER
Please notify patient according to new CDC guidelines if he is symptomatic and tested negative for Covid must still quarantine for 10 days.

## 2020-12-03 NOTE — TELEPHONE ENCOUNTER
PATIENT CALLED STATED WAITING FOR COVID RESULTS.  ALWAYS GETS THESE SYMPTOMS AT THIS TIME OF YEAR.  WOULD LIKE TO HAVE AN ANTIBIOTIC OR OTHER MEDICATIONS THAT CAN HELP ALLEVIATE HIS SYMPTOMS.    PATIENT WOULD ALSO LIKE A NOTE FOR WORK.  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    PATIENT USES   [facility]/pharmacy [id number] [place] - [address] [address] AT [facility] [address] [address] - [phone]  - [phone] FX     PLEASE ADVISE TODAY  [phone]

## 2020-12-03 NOTE — TELEPHONE ENCOUNTER
Pt called back.  Stated he doesn't have adequate PTO to be off until Monday.  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

## 2020-12-03 NOTE — TELEPHONE ENCOUNTER
Pt states mild onset of symptoms began the night of Tuesday the 24th. Today should be the 10th day.    Pt also mentioned his note in mychart reflects he being seen today, but he was actually seen on the 1st.     Please advise.

## 2020-12-31 RX ORDER — OMEPRAZOLE 20 MG/1
20 CAPSULE, DELAYED RELEASE ORAL EVERY MORNING
Qty: 90 CAPSULE | Refills: 0 | Status: SHIPPED | OUTPATIENT
Start: 2020-12-31 | End: 2021-04-30

## 2021-01-28 ENCOUNTER — OFFICE VISIT (OUTPATIENT)
Dept: ORTHOPEDIC SURGERY | Facility: CLINIC | Age: 41
End: 2021-01-28

## 2021-01-28 VITALS — HEIGHT: 72 IN | BODY MASS INDEX: 26.28 KG/M2 | WEIGHT: 194 LBS

## 2021-01-28 DIAGNOSIS — M17.0 PRIMARY OSTEOARTHRITIS OF BOTH KNEES: Primary | ICD-10-CM

## 2021-01-28 DIAGNOSIS — M77.11 LATERAL EPICONDYLITIS, RIGHT ELBOW: ICD-10-CM

## 2021-01-28 PROCEDURE — 73562 X-RAY EXAM OF KNEE 3: CPT | Performed by: ORTHOPAEDIC SURGERY

## 2021-01-28 PROCEDURE — 20610 DRAIN/INJ JOINT/BURSA W/O US: CPT | Performed by: ORTHOPAEDIC SURGERY

## 2021-01-28 PROCEDURE — 99213 OFFICE O/P EST LOW 20 MIN: CPT | Performed by: ORTHOPAEDIC SURGERY

## 2021-01-28 RX ORDER — TRIAMCINOLONE ACETONIDE 40 MG/ML
40 INJECTION, SUSPENSION INTRA-ARTICULAR; INTRAMUSCULAR
Status: COMPLETED | OUTPATIENT
Start: 2021-01-28 | End: 2021-01-28

## 2021-01-28 RX ORDER — LIDOCAINE HYDROCHLORIDE 10 MG/ML
4 INJECTION, SOLUTION EPIDURAL; INFILTRATION; INTRACAUDAL; PERINEURAL
Status: COMPLETED | OUTPATIENT
Start: 2021-01-28 | End: 2021-01-28

## 2021-01-28 RX ORDER — MELOXICAM 7.5 MG/1
7.5 TABLET ORAL DAILY
Qty: 30 TABLET | Refills: 5 | Status: SHIPPED | OUTPATIENT
Start: 2021-01-28 | End: 2021-09-02 | Stop reason: SDUPTHER

## 2021-01-28 RX ADMIN — TRIAMCINOLONE ACETONIDE 40 MG: 40 INJECTION, SUSPENSION INTRA-ARTICULAR; INTRAMUSCULAR at 10:29

## 2021-01-28 RX ADMIN — LIDOCAINE HYDROCHLORIDE 4 ML: 10 INJECTION, SOLUTION EPIDURAL; INFILTRATION; INTRACAUDAL; PERINEURAL at 10:29

## 2021-01-28 NOTE — PROGRESS NOTES
Subjective: Bilateral knee pain, right elbow pain     Patient ID: Carson Crespo is a 40 y.o. male.    Chief Complaint:    History of Present Illness 40-year-old male returns today for possible reinjection of cortisone to both knees.  Was last seen 2 years ago did well to just recently and is having pain once again on a daily activity.  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       Social History     Occupational History   • Not on file   Tobacco Use   • Smoking status: Former Smoker   • Smokeless tobacco: Current User   Substance and Sexual Activity   • Alcohol use: Yes     Alcohol/week: 4.0 standard drinks     Types: 4 Cans of beer per week     Comment: 3   • Drug use: Defer   • Sexual activity: Defer      Review of Systems   Constitutional: Negative for chills, diaphoresis, fever and unexpected weight change.   HENT: Negative for hearing loss, nosebleeds, sore throat and tinnitus.    Eyes: Negative for pain and visual disturbance.   Respiratory: Negative for cough, shortness of breath and wheezing.    Cardiovascular: Negative for chest pain and palpitations.   Gastrointestinal: Negative for abdominal pain, diarrhea, nausea and vomiting.   Endocrine: Negative for cold intolerance, heat intolerance and polydipsia.   Genitourinary: Negative for difficulty urinating, dysuria and hematuria.   Musculoskeletal: Positive for arthralgias.   Skin: Negative for rash and wound.   Allergic/Immunologic: Negative for environmental allergies.   Neurological: Negative for dizziness, syncope and numbness.   Hematological: Does not bruise/bleed easily.   Psychiatric/Behavioral: Negative for dysphoric mood and sleep disturbance. The patient is not nervous/anxious.    All other systems reviewed and are negative.        Past Medical  History:   Diagnosis Date   • Allergic    • Anxiety    • CTS (carpal tunnel syndrome)    • Genital herpes    • Headache    • Osteoarthritis    • Skin cancer    • Tear of meniscus of knee      No past surgical history on file.  Family History   Problem Relation Age of Onset   • Cancer Mother    • No Known Problems Father          Objective:  There were no vitals filed for this visit.      01/28/21  1001   Weight: 88 kg (194 lb)     Body mass index is 26.31 kg/m².        Ortho Exam   AP lateral sunrise view of both knees did not show significant change compared to 2 years ago.  Slight decrease in medial joint space.  He is alert and oriented x3.  Neither knee has any swelling effusion erythema there is mild crepitance with range of motion but there is no instability throughout the arc of motion.  Good distal pulses no motor or sensory deficit the skin is cool to touch.  Quad and hamstring function are 5/5 and the calf is nontender.  The right elbow has full range of motion for flexion extension pronation supination.  No motor or sensory deficits 5 radial ulnar median nerve function.  Marked pain to palpation of the lateral epicondyle and there is pain with dorsiflexion of the wrist against resistance and supination.    Assessment:        1. Primary osteoarthritis of both knees    2. Lateral epicondylitis, right elbow           Plan: With regard to the knee as he had a cortisone injection 2 years ago did well to just recently.    Inject both knees today with lidocaine and Kenalog at a ratio 4:1.  That was accomplished through the superolateral portal tolerating it well.  Postinjection instructions given to the patient.  With regard to the elbow start him on meloxican and he was instructed to get a tennis elbow band instructed on stretching exercises.  Return in a month if the elbow still symptomatic for cortisone injection  Large Joint Arthrocentesis  Date/Time: 1/28/2021 10:29 AM  Consent given by: patient  Site  marked: site marked  Timeout: Immediately prior to procedure a time out was called to verify the correct patient, procedure, equipment, support staff and site/side marked as required   Supporting Documentation  Indications: pain   Procedure Details  Location: knee - Knee joint: Bilateral knees.  Preparation: Patient was prepped and draped in the usual sterile fashion  Needle size: 22 G  Approach: superolateral.  Medications administered: 4 mL lidocaine PF 1% 1 %; 40 mg triamcinolone acetonide 40 MG/ML  Patient tolerance: patient tolerated the procedure well with no immediate complications                  Work Status:    SARAH query complete.    Orders:  Orders Placed This Encounter   Procedures   • Large Joint Arthrocentesis   • XR Knee 3 View Bilateral       Medications:  No orders of the defined types were placed in this encounter.      Followup:  Return if symptoms worsen or fail to improve.          Dictated utilizing Dragon dictation

## 2021-03-24 VITALS
OXYGEN SATURATION: 98 % | HEIGHT: 72 IN | HEART RATE: 65 BPM | WEIGHT: 190 LBS | TEMPERATURE: 97.3 F | SYSTOLIC BLOOD PRESSURE: 148 MMHG | DIASTOLIC BLOOD PRESSURE: 98 MMHG

## 2021-03-25 ENCOUNTER — OFFICE (AMBULATORY)
Dept: URBAN - METROPOLITAN AREA CLINIC 75 | Facility: CLINIC | Age: 41
End: 2021-03-25

## 2021-03-25 DIAGNOSIS — K31.89 OTHER DISEASES OF STOMACH AND DUODENUM: ICD-10-CM

## 2021-03-25 DIAGNOSIS — K22.70 BARRETT'S ESOPHAGUS WITHOUT DYSPLASIA: ICD-10-CM

## 2021-03-25 DIAGNOSIS — Z83.71 FAMILY HISTORY OF COLONIC POLYPS: ICD-10-CM

## 2021-03-25 DIAGNOSIS — K21.9 GASTRO-ESOPHAGEAL REFLUX DISEASE WITHOUT ESOPHAGITIS: ICD-10-CM

## 2021-03-25 PROCEDURE — 99204 OFFICE O/P NEW MOD 45 MIN: CPT | Performed by: INTERNAL MEDICINE

## 2021-03-29 ENCOUNTER — DOCUMENTATION (OUTPATIENT)
Dept: PHYSICAL THERAPY | Facility: CLINIC | Age: 41
End: 2021-03-29

## 2021-03-29 DIAGNOSIS — M89.8X1 PAIN OF RIGHT SCAPULA: Primary | ICD-10-CM

## 2021-03-29 DIAGNOSIS — R26.89 DECREASED FUNCTIONAL MOBILITY: ICD-10-CM

## 2021-03-29 DIAGNOSIS — R52 PAIN AGGRAVATED BY LIFTING: ICD-10-CM

## 2021-03-29 NOTE — PROGRESS NOTES
Discharge Summary  Discharge Summary from Physical Therapy Report    Patient Information  Carson Crespo  1980    Dates  PT visit: 8/21/2019-10/15/2019  Number of Visits: 8     Discharge Status of Patient: See Re-eval dated 10/7/2019 and 10/15/2019 for objective measures.      Goals: Partially Met    Visit Diagnoses:    ICD-10-CM ICD-9-CM   1. Pain of right scapula  M89.8X1 733.90   2. Decreased functional mobility  R26.89 781.99   3. Pain aggravated by lifting  R52 780.96       Discharge Plan: Continue with current home exercise program as instructed    Comments Pt went on vacation and pt never returned to PT.  Plan was to discharge after returning from vacation if symptoms continue to be minimal.  Per chart review pt returned to MD and had MRI ordered for neck and thoracic spine.  Per chart review MRI was negative.      Date of Discharge 10/15/2019, but discharge not written until 3/29/2021.          Selene Knag, PT  Physical Therapist

## 2021-04-22 VITALS
RESPIRATION RATE: 18 BRPM | DIASTOLIC BLOOD PRESSURE: 58 MMHG | DIASTOLIC BLOOD PRESSURE: 88 MMHG | HEART RATE: 66 BPM | HEART RATE: 67 BPM | SYSTOLIC BLOOD PRESSURE: 123 MMHG | RESPIRATION RATE: 15 BRPM | SYSTOLIC BLOOD PRESSURE: 106 MMHG | SYSTOLIC BLOOD PRESSURE: 120 MMHG | SYSTOLIC BLOOD PRESSURE: 108 MMHG | OXYGEN SATURATION: 98 % | TEMPERATURE: 97.1 F | HEART RATE: 60 BPM | SYSTOLIC BLOOD PRESSURE: 98 MMHG | HEART RATE: 64 BPM | DIASTOLIC BLOOD PRESSURE: 79 MMHG | HEIGHT: 72 IN | DIASTOLIC BLOOD PRESSURE: 72 MMHG | RESPIRATION RATE: 13 BRPM | DIASTOLIC BLOOD PRESSURE: 54 MMHG | SYSTOLIC BLOOD PRESSURE: 130 MMHG | DIASTOLIC BLOOD PRESSURE: 73 MMHG | HEART RATE: 58 BPM | WEIGHT: 190 LBS | DIASTOLIC BLOOD PRESSURE: 67 MMHG | SYSTOLIC BLOOD PRESSURE: 101 MMHG | RESPIRATION RATE: 16 BRPM | SYSTOLIC BLOOD PRESSURE: 121 MMHG | HEART RATE: 62 BPM | HEART RATE: 75 BPM | OXYGEN SATURATION: 96 % | RESPIRATION RATE: 12 BRPM | DIASTOLIC BLOOD PRESSURE: 56 MMHG | TEMPERATURE: 97.5 F | OXYGEN SATURATION: 97 % | HEART RATE: 72 BPM | SYSTOLIC BLOOD PRESSURE: 132 MMHG | DIASTOLIC BLOOD PRESSURE: 63 MMHG | DIASTOLIC BLOOD PRESSURE: 64 MMHG | OXYGEN SATURATION: 100 % | HEART RATE: 74 BPM

## 2021-04-29 ENCOUNTER — OFFICE (AMBULATORY)
Dept: URBAN - METROPOLITAN AREA PATHOLOGY 4 | Facility: PATHOLOGY | Age: 41
End: 2021-04-29

## 2021-04-29 ENCOUNTER — AMBULATORY SURGICAL CENTER (AMBULATORY)
Dept: URBAN - METROPOLITAN AREA SURGERY 17 | Facility: SURGERY | Age: 41
End: 2021-04-29
Payer: COMMERCIAL

## 2021-04-29 DIAGNOSIS — D12.2 BENIGN NEOPLASM OF ASCENDING COLON: ICD-10-CM

## 2021-04-29 DIAGNOSIS — K63.5 POLYP OF COLON: ICD-10-CM

## 2021-04-29 DIAGNOSIS — K62.1 RECTAL POLYP: ICD-10-CM

## 2021-04-29 DIAGNOSIS — Z12.11 ENCOUNTER FOR SCREENING FOR MALIGNANT NEOPLASM OF COLON: ICD-10-CM

## 2021-04-29 DIAGNOSIS — K21.00 GASTRO-ESOPHAGEAL REFLUX DISEASE WITH ESOPHAGITIS, WITHOUT B: ICD-10-CM

## 2021-04-29 DIAGNOSIS — K21.9 GASTRO-ESOPHAGEAL REFLUX DISEASE WITHOUT ESOPHAGITIS: ICD-10-CM

## 2021-04-29 DIAGNOSIS — Z83.71 FAMILY HISTORY OF COLONIC POLYPS: ICD-10-CM

## 2021-04-29 DIAGNOSIS — K22.70 BARRETT'S ESOPHAGUS WITHOUT DYSPLASIA: ICD-10-CM

## 2021-04-29 PROBLEM — K31.89 OTHER DISEASES OF STOMACH AND DUODENUM: Status: ACTIVE | Noted: 2021-04-29

## 2021-04-29 LAB
GI HISTOLOGY: A. UNSPECIFIED: (no result)
GI HISTOLOGY: B. UNSPECIFIED: (no result)
GI HISTOLOGY: C. UNSPECIFIED: (no result)
GI HISTOLOGY: D. UNSPECIFIED: (no result)
GI HISTOLOGY: E. UNSPECIFIED: (no result)
GI HISTOLOGY: F. UNSPECIFIED: (no result)
GI HISTOLOGY: G. UNSPECIFIED: (no result)
GI HISTOLOGY: PDF REPORT: (no result)

## 2021-04-29 PROCEDURE — 43239 EGD BIOPSY SINGLE/MULTIPLE: CPT | Performed by: INTERNAL MEDICINE

## 2021-04-29 PROCEDURE — 45385 COLONOSCOPY W/LESION REMOVAL: CPT | Mod: 33 | Performed by: INTERNAL MEDICINE

## 2021-04-29 PROCEDURE — 88305 TISSUE EXAM BY PATHOLOGIST: CPT | Performed by: INTERNAL MEDICINE

## 2021-04-29 NOTE — SERVICEHPINOTES
JEREL HERRERA  is a  40  male   who presents today for a  EGD-Colonoscopy   for   the indications listed below. The updated Patient Profile was reviewed prior to the procedure, in conjunction with the Physical Exam, including medical conditions, surgical procedures, medications, allergies, family history and social history. See Physical Exam time stamp below for date and time of HPI completion.Pre-operatively, I reviewed the indication(s) for the procedure, the risks of the procedure [including but not limited to: unexpected bleeding possibly requiring hospitalization and/or unplanned repeat procedures, perforation possibly requiring surgical treatment, missed lesions and complications of sedation/MAC (also explained by anesthesia staff)]. I have evaluated the patient for risks associated with the planned anesthesia and the procedure to be performed and find the patient an acceptable candidate for IV sedation.Multiple opportunities were provided for any questions or concerns, and all questions were answered satisfactorily before any anesthesia was administered. We will proceed with the planned procedure.BR

## 2021-04-30 RX ORDER — OMEPRAZOLE 20 MG/1
20 CAPSULE, DELAYED RELEASE ORAL EVERY MORNING
Qty: 90 CAPSULE | Refills: 0 | Status: SHIPPED | OUTPATIENT
Start: 2021-04-30 | End: 2021-07-29

## 2021-06-28 DIAGNOSIS — A60.00 GENITAL HERPES SIMPLEX, UNSPECIFIED SITE: ICD-10-CM

## 2021-06-28 RX ORDER — VALACYCLOVIR HYDROCHLORIDE 500 MG/1
500 TABLET, FILM COATED ORAL 2 TIMES DAILY
Qty: 21 TABLET | Refills: 0 | Status: SHIPPED | OUTPATIENT
Start: 2021-06-28

## 2021-07-29 RX ORDER — OMEPRAZOLE 20 MG/1
20 CAPSULE, DELAYED RELEASE ORAL EVERY MORNING
Qty: 30 CAPSULE | Refills: 0 | Status: SHIPPED | OUTPATIENT
Start: 2021-07-29 | End: 2021-09-07

## 2021-08-03 DIAGNOSIS — K25.9 GASTRIC ULCER DUE TO NONSTEROIDAL ANTIINFLAMMATORY DRUG (NSAID) THERAPY: Primary | ICD-10-CM

## 2021-08-03 DIAGNOSIS — Z79.1 ENCOUNTER FOR MONITORING CHRONIC NSAID THERAPY: ICD-10-CM

## 2021-08-03 DIAGNOSIS — Z51.81 ENCOUNTER FOR MONITORING CHRONIC NSAID THERAPY: ICD-10-CM

## 2021-08-03 DIAGNOSIS — M17.0 PRIMARY OSTEOARTHRITIS OF BOTH KNEES: ICD-10-CM

## 2021-08-03 DIAGNOSIS — T39.395A GASTRIC ULCER DUE TO NONSTEROIDAL ANTIINFLAMMATORY DRUG (NSAID) THERAPY: Primary | ICD-10-CM

## 2021-08-03 RX ORDER — MELOXICAM 7.5 MG/1
TABLET ORAL
Qty: 30 TABLET | Refills: 5 | OUTPATIENT
Start: 2021-08-03

## 2021-08-03 NOTE — TELEPHONE ENCOUNTER
RX Refill request.    1. Primary osteoarthritis of both knees    2. Lateral epicondylitis, right elbow    Last office visit 01.28.2021

## 2021-08-03 NOTE — TELEPHONE ENCOUNTER
I do not see where the patient has had a recent CBC or CMP.  Once that is completed and if it is satisfactory refill can be given

## 2021-09-01 ENCOUNTER — TELEPHONE (OUTPATIENT)
Dept: ORTHOPEDIC SURGERY | Facility: CLINIC | Age: 41
End: 2021-09-01

## 2021-09-01 ENCOUNTER — LAB (OUTPATIENT)
Dept: LAB | Facility: HOSPITAL | Age: 41
End: 2021-09-01

## 2021-09-01 DIAGNOSIS — M17.0 PRIMARY OSTEOARTHRITIS OF BOTH KNEES: ICD-10-CM

## 2021-09-01 DIAGNOSIS — Z51.81 ENCOUNTER FOR MONITORING CHRONIC NSAID THERAPY: ICD-10-CM

## 2021-09-01 DIAGNOSIS — Z79.1 ENCOUNTER FOR MONITORING CHRONIC NSAID THERAPY: ICD-10-CM

## 2021-09-01 LAB
ALBUMIN SERPL-MCNC: 4.3 G/DL (ref 3.5–5.2)
ALBUMIN/GLOB SERPL: 1.4 G/DL
ALP SERPL-CCNC: 71 U/L (ref 39–117)
ALT SERPL W P-5'-P-CCNC: 22 U/L (ref 1–41)
ANION GAP SERPL CALCULATED.3IONS-SCNC: 7.7 MMOL/L (ref 5–15)
AST SERPL-CCNC: 20 U/L (ref 1–40)
BILIRUB SERPL-MCNC: 0.6 MG/DL (ref 0–1.2)
BUN SERPL-MCNC: 10 MG/DL (ref 6–20)
BUN/CREAT SERPL: 12 (ref 7–25)
CALCIUM SPEC-SCNC: 9.2 MG/DL (ref 8.6–10.5)
CHLORIDE SERPL-SCNC: 104 MMOL/L (ref 98–107)
CO2 SERPL-SCNC: 26.3 MMOL/L (ref 22–29)
CREAT SERPL-MCNC: 0.83 MG/DL (ref 0.76–1.27)
GFR SERPL CREATININE-BSD FRML MDRD: 101 ML/MIN/1.73
GLOBULIN UR ELPH-MCNC: 3 GM/DL
GLUCOSE SERPL-MCNC: 116 MG/DL (ref 65–99)
POTASSIUM SERPL-SCNC: 3.8 MMOL/L (ref 3.5–5.2)
PROT SERPL-MCNC: 7.3 G/DL (ref 6–8.5)
SODIUM SERPL-SCNC: 138 MMOL/L (ref 136–145)

## 2021-09-01 PROCEDURE — 36415 COLL VENOUS BLD VENIPUNCTURE: CPT

## 2021-09-01 PROCEDURE — 80053 COMPREHEN METABOLIC PANEL: CPT

## 2021-09-01 NOTE — TELEPHONE ENCOUNTER
Attempted to contact patient to let him know that per Dr. Aguilera he said he could not fill his Mobic without updated lab work to check his kidney function. Otherwise he can contact his PCP.    Thanks.

## 2021-09-01 NOTE — TELEPHONE ENCOUNTER
Caller: RENETTA OWEN   Relationship to Patient: SELF     Phone Number: [phone]   Reason for Call: PATIENT CALLING STATING THAT DR PITTMAN WANTED HIM TO GET BLOOD WORK BEFORE HE REFILLED HIS MEDICATION, PATIENT WANTED TO KNOW IF HE WOULD BE WILLING TO DO IT ONE MORE TIME WITHOUT THE BLOOD WORK BECAUSE HE IS TOO NERVOUS WITH COVID TO GET HIS BLOOD DRAWN

## 2021-09-02 ENCOUNTER — OFFICE VISIT (OUTPATIENT)
Dept: FAMILY MEDICINE CLINIC | Facility: CLINIC | Age: 41
End: 2021-09-02

## 2021-09-02 VITALS
TEMPERATURE: 98.4 F | BODY MASS INDEX: 26.28 KG/M2 | SYSTOLIC BLOOD PRESSURE: 130 MMHG | WEIGHT: 194 LBS | HEART RATE: 65 BPM | OXYGEN SATURATION: 98 % | HEIGHT: 72 IN | RESPIRATION RATE: 14 BRPM | DIASTOLIC BLOOD PRESSURE: 80 MMHG

## 2021-09-02 DIAGNOSIS — R00.2 HEART PALPITATIONS: ICD-10-CM

## 2021-09-02 DIAGNOSIS — R07.89 ATYPICAL CHEST PAIN: Primary | ICD-10-CM

## 2021-09-02 DIAGNOSIS — Z11.59 NEED FOR HEPATITIS C SCREENING TEST: ICD-10-CM

## 2021-09-02 PROCEDURE — 99214 OFFICE O/P EST MOD 30 MIN: CPT | Performed by: PHYSICIAN ASSISTANT

## 2021-09-02 PROCEDURE — 93000 ELECTROCARDIOGRAM COMPLETE: CPT | Performed by: PHYSICIAN ASSISTANT

## 2021-09-02 RX ORDER — MELOXICAM 7.5 MG/1
7.5 TABLET ORAL DAILY
Qty: 30 TABLET | Refills: 5 | Status: SHIPPED | OUTPATIENT
Start: 2021-09-02 | End: 2021-10-05 | Stop reason: SDUPTHER

## 2021-09-02 NOTE — PROGRESS NOTES
"Chief Complaint  Palpitations (would wake out of sleep, happened 3 times in the last month)    Subjective          Carson Crespo presents to [facility]  History of Present Illness  Carson is a 41 year old male who presents with new onset atypical chest pain with heart palpitations.  States his symptoms have occurred 3 times in the past month.  States the pain is located on the left side of sternal border.  He will have a sharp pain that wakes him up from sleep.  The pain lasts for a few seconds and does not return.  The pain has only occurred when he lies down.  Denied any radiation of chest pain to neck or down the arms.  He has a history of Sue's esophagitis which he treats with Prilosec.  Drinks approximately 48 ounces of coffee in the morning.  Does drink water the rest of the day.  Will drink 2-3 red bull vodka drinks about 3-4 times weekly.  Currently smokes about half a pack of cigarettes daily.  He has smoked cigarettes for the past 20 years.  Also uses chewing tobacco.  Denied any heartburn, wheezing, shortness of breath, current chest pain, nausea, vomiting, coffee-ground emesis or swelling of ankles.  Denied any dark black tarry stools.  Had GI work-up earlier this year.  Dad had a heart attack in his early 60's.  Paternal grandfather has also had heart attacks later in life.     Objective   Vital Signs:   /80 (BP Location: Left arm, Patient Position: Sitting, Cuff Size: Adult)   Pulse 65   Temp 98.4 °F (36.9 °C)   Resp 14   Ht 182.9 cm (72\")   Wt 88 kg (194 lb)   SpO2 98%   BMI 26.31 kg/m²     Physical Exam  Vitals and nursing note reviewed.   Constitutional:       Appearance: Normal appearance. He is well-developed, well-groomed and overweight.      Interventions: Face mask in place.   HENT:      Head: Normocephalic and atraumatic.   Neck:      Thyroid: No thyroid mass, thyromegaly or thyroid tenderness.      Vascular: No carotid bruit.      Trachea: Trachea " and phonation normal. No tracheal tenderness.   Cardiovascular:      Rate and Rhythm: Normal rate and regular rhythm.      Chest Wall: PMI is not displaced.      Pulses: Normal pulses.      Heart sounds: Normal heart sounds, S1 normal and S2 normal. No murmur heard.     Pulmonary:      Effort: Pulmonary effort is normal.      Breath sounds: Normal breath sounds and air entry.   Abdominal:      General: Bowel sounds are normal.      Palpations: Abdomen is soft. There is no hepatomegaly.      Tenderness: There is no abdominal tenderness. There is no right CVA tenderness, left CVA tenderness, guarding or rebound. Negative signs include Cramer's sign, Rovsing's sign, McBurney's sign, psoas sign and obturator sign.   Musculoskeletal:      Cervical back: Neck supple.      Right lower leg: No edema.      Left lower leg: No edema.   Skin:     General: Skin is warm and dry.      Capillary Refill: Capillary refill takes less than 2 seconds.   Neurological:      Mental Status: He is alert and oriented to person, place, and time.   Psychiatric:         Attention and Perception: Attention and perception normal.         Mood and Affect: Mood and affect normal.         Speech: Speech normal.         Behavior: Behavior normal. Behavior is cooperative.         Thought Content: Thought content normal.         Cognition and Memory: Cognition and memory normal.         Judgment: Judgment normal.     I was wearing a surgical mask and face shield during the entire office visit/encounter.     Result Review :            ECG 12 Lead    Date/Time: 9/2/2021 11:55 AM  Performed by: Aneta Taveras PA-C  Authorized by: Aneta Taveras PA-C   Comparison: not compared with previous ECG   Previous ECG: no previous ECG available  Rhythm: sinus rhythm  Rate: normal  BPM: 60  Conduction: right bundle branch block  ST Segments: ST segments normal  T Waves: T waves normal  QRS axis: normal  Clinical impression comment: Borderline EKG  Comments:  Indication: Atypical chest pain with heart palpitations  AZ int:  160 ms  QRS dur:  110 ms  QT/QTc:  396/397 ms                Assessment and Plan    Diagnoses and all orders for this visit:    1. Atypical chest pain (Primary)  -     CBC & Differential  -     TSH  -     Holter monitor - 48 hour; Future  -     Adult Stress Echo W/ Cont or Stress Agent if Necessary Per Protocol; Future  -     ECG 12 Lead    2. Need for hepatitis C screening test  -     Hepatitis C Antibody    3. Heart palpitations  -     CBC & Differential  -     TSH  -     Holter monitor - 48 hour; Future  -     Adult Stress Echo W/ Cont or Stress Agent if Necessary Per Protocol; Future  -     ECG 12 Lead    Other orders  -     Cancel: Comprehensive Metabolic Panel; Future    1.  New atypical chest pain with heart palpitations: In office EKG showed sinus rhythm with incomplete right bundle branch.  Will have CBC, TSH collected office visit today.  He will have a 48-hour Holter monitor and stress echocardiogram for further evaluation.  Stressed the importance if symptoms continue or worsen before additional testing is completed, he will go to the nearest emergency room for evaluation and treatment.  I have stressed the importance of decreasing his caffeine intake as well.  He will also try to quit smoking and drink less alcohol.  Carson voiced understanding .  I will consider referral to cardiology in future if warranted.  2.  Need for screening hepatitis C: He will have screening hepatitis C antibody test with above blood work today.  Will be notified of test results.    Follow Up   No follow-ups on file.  Patient was given instructions and counseling regarding his condition or for health maintenance advice. Please see specific information pulled into the AVS if appropriate.     BORIS [facility]  [address] [place] [id number]  Dept: [phone]  Dept Fax:  [phone]  Loc: [phone]  Loc Fax: [phone]

## 2021-09-03 LAB
BASOPHILS # BLD AUTO: 0.1 X10E3/UL (ref 0–0.2)
BASOPHILS NFR BLD AUTO: 1 %
EOSINOPHIL # BLD AUTO: 0.3 X10E3/UL (ref 0–0.4)
EOSINOPHIL NFR BLD AUTO: 3 %
ERYTHROCYTE [DISTWIDTH] IN BLOOD BY AUTOMATED COUNT: 12.4 % (ref 11.6–15.4)
HCT VFR BLD AUTO: 46.2 % (ref 37.5–51)
HCV AB S/CO SERPL IA: <0.1 S/CO RATIO (ref 0–0.9)
HGB BLD-MCNC: 15.6 G/DL (ref 13–17.7)
IMM GRANULOCYTES # BLD AUTO: 0 X10E3/UL (ref 0–0.1)
IMM GRANULOCYTES NFR BLD AUTO: 1 %
LYMPHOCYTES # BLD AUTO: 1.4 X10E3/UL (ref 0.7–3.1)
LYMPHOCYTES NFR BLD AUTO: 16 %
MCH RBC QN AUTO: 30.6 PG (ref 26.6–33)
MCHC RBC AUTO-ENTMCNC: 33.8 G/DL (ref 31.5–35.7)
MCV RBC AUTO: 91 FL (ref 79–97)
MONOCYTES # BLD AUTO: 0.7 X10E3/UL (ref 0.1–0.9)
MONOCYTES NFR BLD AUTO: 8 %
NEUTROPHILS # BLD AUTO: 6.4 X10E3/UL (ref 1.4–7)
NEUTROPHILS NFR BLD AUTO: 71 %
PLATELET # BLD AUTO: 246 X10E3/UL (ref 150–450)
RBC # BLD AUTO: 5.1 X10E6/UL (ref 4.14–5.8)
TSH SERPL DL<=0.005 MIU/L-ACNC: 1.51 UIU/ML (ref 0.45–4.5)
WBC # BLD AUTO: 8.8 X10E3/UL (ref 3.4–10.8)

## 2021-09-03 NOTE — PROGRESS NOTES
I, Dr. Andrey Vasquez, have reviewed the notes, assessments, and/or procedures performed by Aneta AZUL, that occurred within our practice at [facility] location, I concur with her documentation of Carson Crespo. I have a current collaborative medical agreement with Aneta AZUL.

## 2021-09-07 RX ORDER — OMEPRAZOLE 20 MG/1
20 CAPSULE, DELAYED RELEASE ORAL EVERY MORNING
Qty: 30 CAPSULE | Refills: 6 | Status: SHIPPED | OUTPATIENT
Start: 2021-09-07

## 2021-09-14 ENCOUNTER — HOSPITAL ENCOUNTER (OUTPATIENT)
Dept: CARDIOLOGY | Facility: HOSPITAL | Age: 41
Discharge: HOME | End: 2021-09-14
Admitting: PHYSICIAN ASSISTANT

## 2021-09-14 DIAGNOSIS — R07.89 ATYPICAL CHEST PAIN: ICD-10-CM

## 2021-09-14 DIAGNOSIS — R00.2 HEART PALPITATIONS: ICD-10-CM

## 2021-09-14 PROCEDURE — 93225 XTRNL ECG REC<48 HRS REC: CPT

## 2021-09-14 PROCEDURE — 93226 XTRNL ECG REC<48 HR SCAN A/R: CPT

## 2021-09-16 ENCOUNTER — HOSPITAL ENCOUNTER (OUTPATIENT)
Dept: CARDIOLOGY | Facility: HOSPITAL | Age: 41
Discharge: HOME | End: 2021-09-16
Admitting: PHYSICIAN ASSISTANT

## 2021-09-16 VITALS — WEIGHT: 194 LBS | HEIGHT: 72 IN | BODY MASS INDEX: 26.28 KG/M2

## 2021-09-16 DIAGNOSIS — R07.89 ATYPICAL CHEST PAIN: ICD-10-CM

## 2021-09-16 DIAGNOSIS — R00.2 HEART PALPITATIONS: ICD-10-CM

## 2021-09-16 LAB
AORTIC DIMENSIONLESS INDEX: 0.7 (DI)
BH CV ECHO MEAS - ACS: 2.2 CM
BH CV ECHO MEAS - AO MAX PG (FULL): 2.8 MMHG
BH CV ECHO MEAS - AO MAX PG: 7.3 MMHG
BH CV ECHO MEAS - AO MEAN PG (FULL): 2 MMHG
BH CV ECHO MEAS - AO MEAN PG: 4 MMHG
BH CV ECHO MEAS - AO ROOT AREA (BSA CORRECTED): 1.5
BH CV ECHO MEAS - AO ROOT AREA: 8 CM^2
BH CV ECHO MEAS - AO ROOT DIAM: 3.2 CM
BH CV ECHO MEAS - AO V2 MAX: 135 CM/SEC
BH CV ECHO MEAS - AO V2 MEAN: 86.9 CM/SEC
BH CV ECHO MEAS - AO V2 VTI: 30.6 CM
BH CV ECHO MEAS - AVA(I,A): 3.1 CM^2
BH CV ECHO MEAS - AVA(I,D): 3.1 CM^2
BH CV ECHO MEAS - AVA(V,A): 3.3 CM^2
BH CV ECHO MEAS - AVA(V,D): 3.3 CM^2
BH CV ECHO MEAS - BSA(HAYCOCK): 2.1 M^2
BH CV ECHO MEAS - BSA: 2.1 M^2
BH CV ECHO MEAS - BZI_BMI: 26.3 KILOGRAMS/M^2
BH CV ECHO MEAS - BZI_METRIC_HEIGHT: 182.9 CM
BH CV ECHO MEAS - BZI_METRIC_WEIGHT: 88 KG
BH CV ECHO MEAS - CONTRAST EF 4CH: 53 CM2
BH CV ECHO MEAS - EDV(CUBED): 148.9 ML
BH CV ECHO MEAS - EDV(MOD-SP2): 83 ML
BH CV ECHO MEAS - EDV(MOD-SP4): 170 ML
BH CV ECHO MEAS - EDV(TEICH): 135.3 ML
BH CV ECHO MEAS - EF(CUBED): 53.7 %
BH CV ECHO MEAS - EF(MOD-BP): 53 %
BH CV ECHO MEAS - EF(MOD-SP2): 59 %
BH CV ECHO MEAS - EF(TEICH): 45.2 %
BH CV ECHO MEAS - ESV(CUBED): 68.9 ML
BH CV ECHO MEAS - ESV(MOD-SP2): 34 ML
BH CV ECHO MEAS - ESV(MOD-SP4): 86 ML
BH CV ECHO MEAS - ESV(TEICH): 74.2 ML
BH CV ECHO MEAS - FS: 22.6 %
BH CV ECHO MEAS - IVS/LVPW: 1
BH CV ECHO MEAS - IVSD: 1.2 CM
BH CV ECHO MEAS - LAT PEAK E' VEL: 11.4 CM/SEC
BH CV ECHO MEAS - LV DIASTOLIC VOL/BSA (35-75): 80.8 ML/M^2
BH CV ECHO MEAS - LV MASS(C)D: 256.6 GRAMS
BH CV ECHO MEAS - LV MASS(C)DI: 122 GRAMS/M^2
BH CV ECHO MEAS - LV MAX PG: 4.5 MMHG
BH CV ECHO MEAS - LV MEAN PG: 2 MMHG
BH CV ECHO MEAS - LV SYSTOLIC VOL/BSA (12-30): 40.9 ML/M^2
BH CV ECHO MEAS - LV V1 MAX: 106 CM/SEC
BH CV ECHO MEAS - LV V1 MEAN: 69.5 CM/SEC
BH CV ECHO MEAS - LV V1 VTI: 22.6 CM
BH CV ECHO MEAS - LVIDD: 5.3 CM
BH CV ECHO MEAS - LVIDS: 4.1 CM
BH CV ECHO MEAS - LVLD AP2: 7.7 CM
BH CV ECHO MEAS - LVLD AP4: 9.1 CM
BH CV ECHO MEAS - LVLS AP2: 7.2 CM
BH CV ECHO MEAS - LVLS AP4: 8.8 CM
BH CV ECHO MEAS - LVOT AREA (M): 4.2 CM^2
BH CV ECHO MEAS - LVOT AREA: 4.2 CM^2
BH CV ECHO MEAS - LVOT DIAM: 2.3 CM
BH CV ECHO MEAS - LVPWD: 1.2 CM
BH CV ECHO MEAS - MED PEAK E' VEL: 8.4 CM/SEC
BH CV ECHO MEAS - MV A DUR: 0.2 SEC
BH CV ECHO MEAS - MV A MAX VEL: 62.1 CM/SEC
BH CV ECHO MEAS - MV DEC SLOPE: 263 CM/SEC^2
BH CV ECHO MEAS - MV DEC TIME: 206 SEC
BH CV ECHO MEAS - MV E MAX VEL: 76.7 CM/SEC
BH CV ECHO MEAS - MV E/A: 1.2
BH CV ECHO MEAS - MV MAX PG: 3 MMHG
BH CV ECHO MEAS - MV MEAN PG: 1 MMHG
BH CV ECHO MEAS - MV P1/2T MAX VEL: 94.5 CM/SEC
BH CV ECHO MEAS - MV P1/2T: 105.2 MSEC
BH CV ECHO MEAS - MV V2 MAX: 87.3 CM/SEC
BH CV ECHO MEAS - MV V2 MEAN: 46.5 CM/SEC
BH CV ECHO MEAS - MV V2 VTI: 35.5 CM
BH CV ECHO MEAS - MVA P1/2T LCG: 2.3 CM^2
BH CV ECHO MEAS - MVA(P1/2T): 2.1 CM^2
BH CV ECHO MEAS - MVA(VTI): 2.6 CM^2
BH CV ECHO MEAS - PA ACC TIME: 0.15 SEC
BH CV ECHO MEAS - PA MAX PG (FULL): 3.2 MMHG
BH CV ECHO MEAS - PA MAX PG: 4.5 MMHG
BH CV ECHO MEAS - PA PR(ACCEL): 11.1 MMHG
BH CV ECHO MEAS - PA V2 MAX: 106 CM/SEC
BH CV ECHO MEAS - PI END-D VEL: 103 CM/SEC
BH CV ECHO MEAS - PULM A REVS DUR: 0.15 SEC
BH CV ECHO MEAS - PULM A REVS VEL: 27.1 CM/SEC
BH CV ECHO MEAS - PULM DIAS VEL: 46.5 CM/SEC
BH CV ECHO MEAS - PULM S/D: 1.4
BH CV ECHO MEAS - PULM SYS VEL: 66.3 CM/SEC
BH CV ECHO MEAS - PVA(V,A): 2.2 CM^2
BH CV ECHO MEAS - PVA(V,D): 2.2 CM^2
BH CV ECHO MEAS - QP/QS: 0.54
BH CV ECHO MEAS - RAP SYSTOLE: 3 MMHG
BH CV ECHO MEAS - RV MAX PG: 1.3 MMHG
BH CV ECHO MEAS - RV MEAN PG: 1 MMHG
BH CV ECHO MEAS - RV V1 MAX: 57.4 CM/SEC
BH CV ECHO MEAS - RV V1 MEAN: 38.3 CM/SEC
BH CV ECHO MEAS - RV V1 VTI: 12.2 CM
BH CV ECHO MEAS - RVOT AREA: 4.2 CM^2
BH CV ECHO MEAS - RVOT DIAM: 2.3 CM
BH CV ECHO MEAS - RVSP: 22 MMHG
BH CV ECHO MEAS - SI(AO): 117 ML/M^2
BH CV ECHO MEAS - SI(CUBED): 38 ML/M^2
BH CV ECHO MEAS - SI(LVOT): 44.6 ML/M^2
BH CV ECHO MEAS - SI(MOD-SP2): 23.3 ML/M^2
BH CV ECHO MEAS - SI(MOD-SP4): 39.9 ML/M^2
BH CV ECHO MEAS - SI(TEICH): 29.1 ML/M^2
BH CV ECHO MEAS - SV(AO): 246.1 ML
BH CV ECHO MEAS - SV(CUBED): 80 ML
BH CV ECHO MEAS - SV(LVOT): 93.9 ML
BH CV ECHO MEAS - SV(MOD-SP2): 49 ML
BH CV ECHO MEAS - SV(MOD-SP4): 84 ML
BH CV ECHO MEAS - SV(RVOT): 50.7 ML
BH CV ECHO MEAS - SV(TEICH): 61.1 ML
BH CV ECHO MEAS - TAPSE (>1.6): 1.7 CM
BH CV ECHO MEAS - TR MAX VEL: 215 CM/SEC
BH CV ECHO MEASUREMENTS AVERAGE E/E' RATIO: 7.75
BH CV STRESS BP STAGE 1: NORMAL
BH CV STRESS BP STAGE 2: NORMAL
BH CV STRESS BP STAGE 3: NORMAL
BH CV STRESS BP STAGE 4: NORMAL
BH CV STRESS DURATION MIN STAGE 1: 3
BH CV STRESS DURATION MIN STAGE 2: 3
BH CV STRESS DURATION MIN STAGE 3: 3
BH CV STRESS DURATION MIN STAGE 4: 0
BH CV STRESS DURATION SEC STAGE 1: 0
BH CV STRESS DURATION SEC STAGE 2: 0
BH CV STRESS DURATION SEC STAGE 3: 0
BH CV STRESS DURATION SEC STAGE 4: 55
BH CV STRESS ECHO POST STRESS EJECTION FRACTION EF: 65 %
BH CV STRESS GRADE STAGE 1: 10
BH CV STRESS GRADE STAGE 2: 12
BH CV STRESS GRADE STAGE 3: 14
BH CV STRESS GRADE STAGE 4: 16
BH CV STRESS HR STAGE 1: 86
BH CV STRESS HR STAGE 2: 109
BH CV STRESS HR STAGE 3: 152
BH CV STRESS HR STAGE 4: 162
BH CV STRESS METS STAGE 1: 5
BH CV STRESS METS STAGE 2: 7.5
BH CV STRESS METS STAGE 3: 10
BH CV STRESS METS STAGE 4: 13.5
BH CV STRESS PROTOCOL 1: NORMAL
BH CV STRESS RECOVERY BP: NORMAL MMHG
BH CV STRESS RECOVERY HR: 94 BPM
BH CV STRESS SPEED STAGE 1: 1.7
BH CV STRESS SPEED STAGE 2: 2.5
BH CV STRESS SPEED STAGE 3: 3.4
BH CV STRESS SPEED STAGE 4: 4.2
BH CV STRESS STAGE 1: 1
BH CV STRESS STAGE 2: 2
BH CV STRESS STAGE 3: 3
BH CV STRESS STAGE 4: 4
BH CV XLRA - RV BASE: 3.2 CM
BH CV XLRA - RV LENGTH: 7.3 CM
BH CV XLRA - RV MID: 2.1 CM
BH CV XLRA - TDI S': 9.9 CM/SEC
LEFT ATRIUM VOLUME INDEX: 31 ML/M2
MAXIMAL PREDICTED HEART RATE: 177 BPM
MAXIMAL PREDICTED HEART RATE: 177 BPM
PERCENT MAX PREDICTED HR: 91.53 %
SINUS: 3.2 CM
STJ: 3 CM
STRESS BASELINE BP: NORMAL MMHG
STRESS BASELINE HR: 60 BPM
STRESS PERCENT HR: 108 %
STRESS POST ESTIMATED WORKLOAD: 11.8 METS
STRESS POST EXERCISE DUR MIN: 9 MIN
STRESS POST EXERCISE DUR SEC: 55 SEC
STRESS POST PEAK BP: NORMAL MMHG
STRESS POST PEAK HR: 162 BPM
STRESS TARGET HR: 150 BPM
STRESS TARGET HR: 150 BPM

## 2021-09-16 PROCEDURE — 93350 STRESS TTE ONLY: CPT

## 2021-09-16 PROCEDURE — 93320 DOPPLER ECHO COMPLETE: CPT

## 2021-09-16 PROCEDURE — 93325 DOPPLER ECHO COLOR FLOW MAPG: CPT

## 2021-09-16 PROCEDURE — 93018 CV STRESS TEST I&R ONLY: CPT | Performed by: INTERNAL MEDICINE

## 2021-09-16 PROCEDURE — 93016 CV STRESS TEST SUPVJ ONLY: CPT | Performed by: INTERNAL MEDICINE

## 2021-09-16 PROCEDURE — 93325 DOPPLER ECHO COLOR FLOW MAPG: CPT | Performed by: INTERNAL MEDICINE

## 2021-09-16 PROCEDURE — 93320 DOPPLER ECHO COMPLETE: CPT | Performed by: INTERNAL MEDICINE

## 2021-09-16 PROCEDURE — 93350 STRESS TTE ONLY: CPT | Performed by: INTERNAL MEDICINE

## 2021-09-16 PROCEDURE — 93017 CV STRESS TEST TRACING ONLY: CPT

## 2021-09-16 PROCEDURE — 25010000002 PERFLUTREN (DEFINITY) 8.476 MG IN SODIUM CHLORIDE (PF) 0.9 % 10 ML INJECTION: Performed by: PHYSICIAN ASSISTANT

## 2021-09-16 PROCEDURE — 93352 ADMIN ECG CONTRAST AGENT: CPT | Performed by: INTERNAL MEDICINE

## 2021-09-16 PROCEDURE — 93227 XTRNL ECG REC<48 HR R&I: CPT | Performed by: INTERNAL MEDICINE

## 2021-09-16 RX ADMIN — SODIUM CHLORIDE 3 ML: 9 INJECTION INTRAMUSCULAR; INTRAVENOUS; SUBCUTANEOUS at 13:32

## 2021-10-05 ENCOUNTER — TELEPHONE (OUTPATIENT)
Dept: ORTHOPEDIC SURGERY | Facility: CLINIC | Age: 41
End: 2021-10-05

## 2021-10-05 RX ORDER — MELOXICAM 7.5 MG/1
7.5 TABLET ORAL DAILY
Qty: 30 TABLET | Refills: 5 | OUTPATIENT
Start: 2021-10-05 | End: 2022-05-12

## 2021-10-05 RX ORDER — MELOXICAM 7.5 MG/1
7.5 TABLET ORAL DAILY
Qty: 30 TABLET | Refills: 5 | Status: CANCELLED | OUTPATIENT
Start: 2021-10-05

## 2021-10-05 NOTE — TELEPHONE ENCOUNTER
Please see below...     Dr. Aguilera patient a pharmacy change.    New pharmacy in the chart.     Thanks.

## 2021-10-05 NOTE — TELEPHONE ENCOUNTER
Caller: RENETTA OWEN    Relationship:  SELF      Medication requested (name and dosage): meloxicam (MOBIC) 7.5 MG         Pharmacy where request should be sent: [id number] [address]     Additional details provided by patient: PATIENT WANTS TO CHANGE THE PHARMACY TO THE ONE ABOVE    Best call back number:     Does the patient have less than a 3 day supply:  [x] Yes  [] No    Heatehr Dobson Rep   10/05/21 12:25 EDT

## 2022-02-04 ENCOUNTER — TELEMEDICINE (OUTPATIENT)
Dept: FAMILY MEDICINE CLINIC | Facility: CLINIC | Age: 42
End: 2022-02-04

## 2022-02-04 DIAGNOSIS — J01.00 ACUTE MAXILLARY SINUSITIS, RECURRENCE NOT SPECIFIED: Primary | ICD-10-CM

## 2022-02-04 DIAGNOSIS — U07.1 COVID-19 VIRUS INFECTION: ICD-10-CM

## 2022-02-04 PROCEDURE — 99213 OFFICE O/P EST LOW 20 MIN: CPT | Performed by: PHYSICIAN ASSISTANT

## 2022-02-04 RX ORDER — AZITHROMYCIN 250 MG/1
TABLET, FILM COATED ORAL
Qty: 6 TABLET | Refills: 0 | OUTPATIENT
Start: 2022-02-04 | End: 2022-05-12

## 2022-02-04 RX ORDER — METHYLPREDNISOLONE 4 MG/1
TABLET ORAL
Qty: 21 TABLET | Refills: 0 | OUTPATIENT
Start: 2022-02-04 | End: 2022-05-12

## 2022-02-04 NOTE — PROGRESS NOTES
"Chief Complaint  Sinusitis (COVID positive on 1/29/22)    Subjective            You have chosen to receive care through a telehealth visit.  Do you consent to use a video/audio connection for your medical care today? 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  I am currently at my private office at [facility]    Carson Crespo presents to [facility]  History of Present Illness    Carson is a 41-year-old male who presents using video visit today for increased sinus pressure, ear pressure, slight dull headache and head congestion.  States his symptoms started on Friday, January 28, 2022.  States he had the shakes, chills, really bad headache and felt \"horrible\".  Took a home Covid test on Saturday, January 29, 2022 which was positive. crason has had his COVID immunization but not his booster.  Son has also had COVID recently.  States he had a slight fever during that time as well.  States he has not gone to work all last week.  States he is taking 2 more test at home and still testing positive.  He will be through with his quarantine and should return back to work on Monday.  He is still having sinus pressure, ear pain, ear pressure, and a dull headache.  Carson has used over-the-counter Dayquil, Nyquil, Mucinex, Sinex and vitamin C with some relief.  Appetite and sleep have been normal.  Sometimes his sleep is restless if he cannot breathe out of nose.  He has had an occasional clear rhinorrhea.  Denied any fevers, chills, body aches, nausea, vomiting, diarrhea, loss of taste/smell, or sore throat.  States he has been quarantining at home.  Review of Systems   HENT: Positive for congestion, rhinorrhea and sinus pressure.    All other systems reviewed and are negative.    Objective   Vital Signs: No vital signs were obtained due to video visit  There were no vitals taken for this visit.    Physical Exam  Constitutional:       Appearance: " Normal appearance. He is well-developed and well-groomed.   HENT:      Head: Normocephalic and atraumatic.      Right Ear: Hearing normal.      Left Ear: Hearing normal.   Pulmonary:      Effort: Pulmonary effort is normal.      Breath sounds: Normal breath sounds.   Neurological:      Mental Status: He is alert and oriented to person, place, and time.   Psychiatric:         Attention and Perception: Attention and perception normal.         Mood and Affect: Mood and affect normal.         Speech: Speech normal.         Behavior: Behavior normal. Behavior is cooperative.         Thought Content: Thought content normal.         Cognition and Memory: Cognition and memory normal.         Judgment: Judgment normal.     Physical exam was limited due to video visit  Result Review :                 Assessment and Plan    Diagnoses and all orders for this visit:    1. Acute maxillary sinusitis, recurrence not specified (Primary)  -     azithromycin (Zithromax Z-Red) 250 MG tablet; Take 2 tablets the first day, then 1 tablet daily for 4 days.  Dispense: 6 tablet; Refill: 0  -     methylPREDNISolone (MEDROL) 4 MG dose pack; Take as directed on package instructions.  Dispense: 21 tablet; Refill: 0    2. COVID-19 virus infection    Carson was seen using video visit today.  He is diagnosed with acute maxillary sinusitis status post COVID infection.  I have sent to pharmacy a Z-Red and a Medrol Dosepak.  May continue his over-the-counter Dayquil, Nyquil, Sinex, vitamin C and Mucinex as directed.  I have placed a work excuse in his electronic medical record.  Should be able to return to work on Monday, February 7, 2022.  If symptoms do not improve, will schedule an office appointment for further evaluation.    I spent 15 minutes caring for Carson on this date of service. This time includes time spent by me in the following activities:preparing for the visit, obtaining and/or reviewing a separately obtained history, performing a  medically appropriate examination and/or evaluation , counseling and educating the patient/family/caregiver, ordering medications, tests, or procedures and documenting information in the medical record     Follow Up   Return if symptoms worsen or fail to improve.  Patient was given instructions and counseling regarding his condition or for health maintenance advice. Please see specific information pulled into the AVS if appropriate.     BORIS [facility]  [address] [place] [id number]  Dept: [phone]  Dept Fax: [phone]  Loc: [phone]  Loc Fax: [phone]

## 2022-12-20 ENCOUNTER — TELEPHONE (OUTPATIENT)
Dept: FAMILY MEDICINE CLINIC | Facility: CLINIC | Age: 42
End: 2022-12-20

## 2022-12-20 ENCOUNTER — OFFICE VISIT (OUTPATIENT)
Dept: FAMILY MEDICINE CLINIC | Facility: CLINIC | Age: 42
End: 2022-12-20

## 2022-12-20 VITALS — HEART RATE: 87 BPM | BODY MASS INDEX: 26.31 KG/M2 | TEMPERATURE: 98.4 F | OXYGEN SATURATION: 97 % | HEIGHT: 72 IN

## 2022-12-20 DIAGNOSIS — U07.1 COVID-19 VIRUS INFECTION: Primary | ICD-10-CM

## 2022-12-20 PROBLEM — M89.8X1 PAIN OF RIGHT SCAPULA: Status: RESOLVED | Noted: 2019-08-12 | Resolved: 2022-12-20

## 2022-12-20 PROBLEM — H57.89 EYE SWELLING, BILATERAL: Status: RESOLVED | Noted: 2020-06-05 | Resolved: 2022-12-20

## 2022-12-20 PROBLEM — H00.011 HORDEOLUM EXTERNUM OF RIGHT UPPER EYELID: Status: RESOLVED | Noted: 2020-06-05 | Resolved: 2022-12-20

## 2022-12-20 PROBLEM — H00.014 HORDEOLUM EXTERNUM OF LEFT UPPER EYELID: Status: RESOLVED | Noted: 2020-06-05 | Resolved: 2022-12-20

## 2022-12-20 PROBLEM — R07.89 ATYPICAL CHEST PAIN: Status: RESOLVED | Noted: 2021-09-02 | Resolved: 2022-12-20

## 2022-12-20 LAB
EXPIRATION DATE: ABNORMAL
FLUAV AG UPPER RESP QL IA.RAPID: NOT DETECTED
FLUBV AG UPPER RESP QL IA.RAPID: NOT DETECTED
INTERNAL CONTROL: ABNORMAL
Lab: ABNORMAL
SARS-COV-2 AG UPPER RESP QL IA.RAPID: DETECTED

## 2022-12-20 PROCEDURE — 99213 OFFICE O/P EST LOW 20 MIN: CPT | Performed by: FAMILY MEDICINE

## 2022-12-20 PROCEDURE — 87428 SARSCOV & INF VIR A&B AG IA: CPT | Performed by: FAMILY MEDICINE

## 2022-12-20 RX ORDER — GUAIFENESIN AND CODEINE PHOSPHATE 100; 10 MG/5ML; MG/5ML
5 SOLUTION ORAL 3 TIMES DAILY PRN
Qty: 180 ML | Refills: 0 | Status: SHIPPED | OUTPATIENT
Start: 2022-12-20

## 2022-12-20 NOTE — TELEPHONE ENCOUNTER
Caller: Carson Crespo    Relationship to patient: Self    Best call back number: [phone]    Patient is needing: PATIENT WAS SEEN IN OFFICE TODAY AND NEEDS A NOTE FOR TODAY AND THE REST OF THE WEEK, DATES SHOULD COVER 12/20-12/23/22. RETURN TO WORK 12/28/22    PLEASE UPLOAD TO PATIENTS MYCHART

## 2022-12-20 NOTE — PROGRESS NOTES
"  Chief Complaint   Patient presents with   • Headache   • Cough     Chest congestion    • Fever     102 last night, chills    • Sore Throat            Answers for HPI/ROS submitted by the patient on 12/20/2022  What is the primary reason for your visit?: Fever  Chronicity: new  Onset: yesterday  Frequency: intermittently  Progression since onset: unchanged  Max temp prior to arrival: 102 to 102.9 F  Temperature source: a tympanic thermometer  abdominal pain: No  chest pain: Yes  congestion: Yes  cough: Yes  diarrhea: No  ear pain: Yes  headaches: Yes  muscle aches: Yes  nausea: No  rash: No  sleepiness: Yes  sore throat: Yes  urinary pain: No  vomiting: No  wheezing: Yes      Upper Respiratory Infection: Patient complains of symptoms of a URI. Symptoms include congestion, cough, fever and sore throat. Onset of symptoms was 2 days ago, gradually worsening since that time. He also c/o low grade fever and sinus pressure for the past 2 days .  He is drinking plenty of fluids. Evaluation to date: none. Treatment to date: none.  Ill contacts at home oo work discussed.  None  No recent COVID vaccines        Vitals:    12/20/22 1339   Pulse: 87   Temp: 98.4 °F (36.9 °C)   SpO2: 97%   Height: 182.9 cm (72\")     Gen: mildly ill appearing, alert  Ears: Tm's bulging without redness  Nose:  Congestion  Throat:  Red without exudate, some drainage, tonsils okay  Neck: no LAD  Lung: , good air movement, regular RR  Heart: RR without murmur  Skin: no rash.    Results for orders placed or performed in visit on 12/20/22   POCT SARS-CoV-2 Antigen JAY + Flu    Specimen: Swab   Result Value Ref Range    SARS Antigen Detected (A) Not Detected, Presumptive Negative    Influenza A Antigen JAY Not Detected Not Detected    Influenza B Antigen JAY Not Detected Not Detected    Internal Control Passed Passed    Lot Number [id number]     Expiration Date 3-9-23        Assessment & Plan   Diagnoses and all orders for this visit:    1. COVID-19 " virus infection (Primary)  -     POCT SARS-CoV-2 Antigen JAY + Flu  -     Nirmatrelvir&Ritonavir 300/100 (PAXLOVID) 20 x 150 MG & 10 x 100MG tablet therapy pack tablet; Take 3 tablets by mouth 2 (Two) Times a Day for 5 days.  Dispense: 30 tablet; Refill: 0  -     guaiFENesin-codeine (GUAIFENESIN AC) 100-10 MG/5ML liquid; Take 5 mL by mouth 3 (Three) Times a Day As Needed for Cough.  Dispense: 180 mL; Refill: 0    Swab positive for COVID-19 infection  5-day home quarantine         There are no Patient Instructions on file for this visit.    Tylenol or Advil as needed for pain, fever, muscle aches  Plenty of fluids  Hand washing discussed    Warm tea for throat.  Pros and cons of antibiotic use discussed,     Dr. Andrey Vasquez MD  [place]  [facility]

## 2023-11-16 ENCOUNTER — OFFICE VISIT (OUTPATIENT)
Dept: ORTHOPEDIC SURGERY | Facility: CLINIC | Age: 43
End: 2023-11-16
Payer: COMMERCIAL

## 2023-11-16 VITALS — WEIGHT: 190 LBS | HEIGHT: 72 IN | BODY MASS INDEX: 25.73 KG/M2

## 2023-11-16 DIAGNOSIS — M17.0 PRIMARY OSTEOARTHRITIS OF BOTH KNEES: Primary | ICD-10-CM

## 2023-11-16 RX ORDER — LIDOCAINE HYDROCHLORIDE 10 MG/ML
4 INJECTION, SOLUTION EPIDURAL; INFILTRATION; INTRACAUDAL; PERINEURAL
Status: COMPLETED | OUTPATIENT
Start: 2023-11-16 | End: 2023-11-16

## 2023-11-16 RX ORDER — VIT C/B6/B5/MAGNESIUM/HERB 173 50-5-6-5MG
CAPSULE ORAL
COMMUNITY

## 2023-11-16 RX ORDER — TRIAMCINOLONE ACETONIDE 40 MG/ML
80 INJECTION, SUSPENSION INTRA-ARTICULAR; INTRAMUSCULAR
Status: COMPLETED | OUTPATIENT
Start: 2023-11-16 | End: 2023-11-16

## 2023-11-16 RX ADMIN — TRIAMCINOLONE ACETONIDE 80 MG: 40 INJECTION, SUSPENSION INTRA-ARTICULAR; INTRAMUSCULAR at 13:25

## 2023-11-16 RX ADMIN — LIDOCAINE HYDROCHLORIDE 4 ML: 10 INJECTION, SOLUTION EPIDURAL; INFILTRATION; INTRACAUDAL; PERINEURAL at 13:25

## 2023-11-16 NOTE — PROGRESS NOTES
Subjective: Bilateral knee     patient ID: Carson Crespo is a 43 y.o. male.    Chief Complaint:    History of Present Illness 43-year-old male returns with his knees once again symptomatic.  Was seen back in 2001 had a cortisone injection that lasted till just recently again had pain with climbing steps walking long distances getting in and out of chair.  Cannot take nonsteroidals because of gastric side effect he does take tumeric but there is given significantly but this time       Social History     Occupational History     Employer: [employer]   Tobacco Use    Smoking status: Some Days     Packs/day: 0.50     Years: 20.00     Additional pack years: 0.00     Total pack years: 10.00     Types: Cigarettes     Start date: 2/1/2022    Smokeless tobacco: Current     Types: Chew    Tobacco comments:     has quit off and on   Vaping Use    Vaping Use: Never used   Substance and Sexual Activity    Alcohol use: Yes     Alcohol/week: 4.0 standard drinks of alcohol     Types: 4 Cans of beer per week     Comment: 3 red bull with vodka     Drug use: Defer    Sexual activity: Yes     Partners: Female      Review of Systems      Past Medical History:   Diagnosis Date    Allergic     Anxiety     CTS (carpal tunnel syndrome)     Genital herpes     Headache     Osteoarthritis     Skin cancer     Tear of meniscus of knee      No past surgical history on file.  Family History   Problem Relation Age of Onset    Cancer Mother     No Known Problems Father          Objective:  There were no vitals filed for this visit.      11/16/23  1259   Weight: 86.2 kg (190 lb)     Body mass index is 25.77 kg/m².        Ortho Exam   AP lateral sunrise view of the knees do not show any significant change from previous x-rays.  Joint spaces are well-maintained.  He is alert and oriented x3.  Neither knee shows any sort effusion erythema.  He has 0-100 30s of motion of both knees with minimal patellofemoral crepitus.  No patellar  instability.  Quad hamstring function 5/5.  Mild medial joint line tenderness bilaterally but negative Lees's.  No instability in flexion or extension nor any varus or valgus instability at 10 to 30 degrees.  Calf nontender.  Distal pulses no sensory deficit    Assessment:        1. Primary osteoarthritis of both knees           Plan: Reviewed the x-rays with the patient along with his history and exam.  There is early osteoarthritis reading chondromalacia both knees.  X-rays some cortisone injection 2 years ago.  Repeat those injections today.  After sterile prepping injection of 4 cc of lidocaine and 2 cc of Kenalog the superolateral portal.  Postinjection instructions given to the patient.  Return to see me as needed.  Answered all questions    - Large Joint Arthrocentesis: bilateral knee on 11/16/2023 1:25 PM  Indications: pain  Details: 22 G needle, superolateral approach  Medications (Right): 4 mL lidocaine PF 1% 1 %; 80 mg triamcinolone acetonide 40 MG/ML  Medications (Left): 4 mL lidocaine PF 1% 1 %; 80 mg triamcinolone acetonide 40 MG/ML  Outcome: tolerated well, no immediate complications  Procedure, treatment alternatives, risks and benefits explained, specific risks discussed. Consent was given by the patient. Immediately prior to procedure a time out was called to verify the correct patient, procedure, equipment, support staff and site/side marked as required. Patient was prepped and draped in the usual sterile fashion.             Work Status:    SARAH query complete.    Orders:  Orders Placed This Encounter   Procedures    - Large Joint Arthrocentesis: bilateral knee    XR Knee 3 View Bilateral       Medications:  No orders of the defined types were placed in this encounter.      Followup:  Return if symptoms worsen or fail to improve.          Dictated utilizing Dragon dictation

## 2024-07-17 ENCOUNTER — OFFICE (AMBULATORY)
Dept: URBAN - METROPOLITAN AREA CLINIC 76 | Facility: CLINIC | Age: 44
End: 2024-07-17

## 2024-07-17 VITALS
HEIGHT: 72 IN | WEIGHT: 203 LBS | HEART RATE: 72 BPM | DIASTOLIC BLOOD PRESSURE: 82 MMHG | SYSTOLIC BLOOD PRESSURE: 118 MMHG | OXYGEN SATURATION: 93 %

## 2024-07-17 DIAGNOSIS — K31.89 OTHER DISEASES OF STOMACH AND DUODENUM: ICD-10-CM

## 2024-07-17 DIAGNOSIS — K21.9 GASTRO-ESOPHAGEAL REFLUX DISEASE WITHOUT ESOPHAGITIS: ICD-10-CM

## 2024-07-17 DIAGNOSIS — Z86.010 PERSONAL HISTORY OF COLONIC POLYPS: ICD-10-CM

## 2024-07-17 DIAGNOSIS — R13.10 DYSPHAGIA, UNSPECIFIED: ICD-10-CM

## 2024-07-17 DIAGNOSIS — K22.70 BARRETT'S ESOPHAGUS WITHOUT DYSPLASIA: ICD-10-CM

## 2024-07-17 DIAGNOSIS — K44.9 DIAPHRAGMATIC HERNIA WITHOUT OBSTRUCTION OR GANGRENE: ICD-10-CM

## 2024-07-17 DIAGNOSIS — K22.2 ESOPHAGEAL OBSTRUCTION: ICD-10-CM

## 2024-07-17 PROCEDURE — 99204 OFFICE O/P NEW MOD 45 MIN: CPT

## 2024-07-17 RX ORDER — OMEPRAZOLE 40 MG/1
40 CAPSULE, DELAYED RELEASE ORAL
Qty: 90 | Refills: 3 | Status: ACTIVE
Start: 2024-07-17

## 2024-07-31 VITALS
DIASTOLIC BLOOD PRESSURE: 74 MMHG | DIASTOLIC BLOOD PRESSURE: 63 MMHG | RESPIRATION RATE: 14 BRPM | RESPIRATION RATE: 13 BRPM | HEART RATE: 62 BPM | HEIGHT: 72 IN | HEART RATE: 45 BPM | RESPIRATION RATE: 12 BRPM | SYSTOLIC BLOOD PRESSURE: 109 MMHG | OXYGEN SATURATION: 94 % | RESPIRATION RATE: 9 BRPM | DIASTOLIC BLOOD PRESSURE: 105 MMHG | RESPIRATION RATE: 8 BRPM | SYSTOLIC BLOOD PRESSURE: 138 MMHG | HEART RATE: 46 BPM | WEIGHT: 203 LBS | SYSTOLIC BLOOD PRESSURE: 134 MMHG | DIASTOLIC BLOOD PRESSURE: 83 MMHG | HEART RATE: 63 BPM | SYSTOLIC BLOOD PRESSURE: 141 MMHG | SYSTOLIC BLOOD PRESSURE: 124 MMHG | SYSTOLIC BLOOD PRESSURE: 127 MMHG | OXYGEN SATURATION: 99 % | OXYGEN SATURATION: 97 % | HEART RATE: 54 BPM | HEART RATE: 61 BPM | SYSTOLIC BLOOD PRESSURE: 119 MMHG | HEART RATE: 53 BPM | OXYGEN SATURATION: 100 % | DIASTOLIC BLOOD PRESSURE: 84 MMHG | DIASTOLIC BLOOD PRESSURE: 89 MMHG | DIASTOLIC BLOOD PRESSURE: 66 MMHG | HEART RATE: 64 BPM | RESPIRATION RATE: 16 BRPM | SYSTOLIC BLOOD PRESSURE: 110 MMHG | TEMPERATURE: 97.6 F | DIASTOLIC BLOOD PRESSURE: 103 MMHG

## 2024-08-05 ENCOUNTER — OFFICE (AMBULATORY)
Dept: URBAN - METROPOLITAN AREA PATHOLOGY 4 | Facility: PATHOLOGY | Age: 44
End: 2024-08-05
Payer: COMMERCIAL

## 2024-08-05 ENCOUNTER — AMBULATORY SURGICAL CENTER (AMBULATORY)
Dept: URBAN - METROPOLITAN AREA SURGERY 17 | Facility: SURGERY | Age: 44
End: 2024-08-05
Payer: COMMERCIAL

## 2024-08-05 DIAGNOSIS — K21.00 GASTRO-ESOPHAGEAL REFLUX DISEASE WITH ESOPHAGITIS, WITHOUT B: ICD-10-CM

## 2024-08-05 DIAGNOSIS — K31.89 OTHER DISEASES OF STOMACH AND DUODENUM: ICD-10-CM

## 2024-08-05 DIAGNOSIS — K22.70 BARRETT'S ESOPHAGUS WITHOUT DYSPLASIA: ICD-10-CM

## 2024-08-05 DIAGNOSIS — R13.10 DYSPHAGIA, UNSPECIFIED: ICD-10-CM

## 2024-08-05 LAB
GI HISTOLOGY: A. STOMACH ANTRUM: (no result)
GI HISTOLOGY: B. LOWER THIRD OF THE ESOPHAGUS: (no result)
GI HISTOLOGY: C. MIDDLE THIRD OF THE ESOPHAGUS: (no result)
GI HISTOLOGY: D. UPPER THIRD OF THE ESOPHAGUS: (no result)
GI HISTOLOGY: PDF REPORT: (no result)

## 2024-08-05 PROCEDURE — 88342 IMHCHEM/IMCYTCHM 1ST ANTB: CPT | Performed by: PATHOLOGY

## 2024-08-05 PROCEDURE — 88305 TISSUE EXAM BY PATHOLOGIST: CPT | Performed by: PATHOLOGY

## 2024-08-05 PROCEDURE — 43239 EGD BIOPSY SINGLE/MULTIPLE: CPT | Performed by: INTERNAL MEDICINE

## 2024-08-05 PROCEDURE — 43450 DILATE ESOPHAGUS 1/MULT PASS: CPT | Performed by: INTERNAL MEDICINE

## 2025-02-04 ENCOUNTER — PATIENT ROUNDING (BHMG ONLY) (OUTPATIENT)
Dept: URGENT CARE | Facility: CLINIC | Age: 45
End: 2025-02-04
Payer: COMMERCIAL

## 2025-02-04 NOTE — ED NOTES
Thank you for letting us care for you during your recent visit at [facility]. We would love to hear about your experience with us.     We’re always looking for ways to make our patients’ experiences even better. Do you have any recommendations on ways we may improve?    I appreciate you taking the time to respond. Please be on the lookout for a survey about your recent visit from [facility] via text or email. We would greatly appreciate if you could fill that out and turn it back in. We want your voice to be heard and we value your feedback.     Thank you for choosing [facility] for your healthcare needs.

## 2025-07-09 ENCOUNTER — OFFICE VISIT (OUTPATIENT)
Dept: FAMILY MEDICINE CLINIC | Facility: CLINIC | Age: 45
End: 2025-07-09
Payer: COMMERCIAL

## 2025-07-09 VITALS
SYSTOLIC BLOOD PRESSURE: 132 MMHG | TEMPERATURE: 98 F | HEIGHT: 72 IN | WEIGHT: 199.8 LBS | HEART RATE: 77 BPM | DIASTOLIC BLOOD PRESSURE: 80 MMHG | BODY MASS INDEX: 27.06 KG/M2 | OXYGEN SATURATION: 95 %

## 2025-07-09 DIAGNOSIS — Z00.00 ANNUAL PHYSICAL EXAM: Primary | ICD-10-CM

## 2025-07-09 DIAGNOSIS — Z76.89 ENCOUNTER TO ESTABLISH CARE: ICD-10-CM

## 2025-07-09 DIAGNOSIS — M19.90 ARTHRITIS: ICD-10-CM

## 2025-07-09 DIAGNOSIS — Z12.5 PROSTATE CANCER SCREENING: ICD-10-CM

## 2025-07-09 DIAGNOSIS — K21.9 GASTROESOPHAGEAL REFLUX DISEASE WITHOUT ESOPHAGITIS: ICD-10-CM

## 2025-07-09 DIAGNOSIS — L98.9 SKIN LESIONS: ICD-10-CM

## 2025-07-09 DIAGNOSIS — Z12.11 COLON CANCER SCREENING: ICD-10-CM

## 2025-07-09 DIAGNOSIS — R73.03 PREDIABETES: ICD-10-CM

## 2025-07-09 DIAGNOSIS — R00.2 PALPITATIONS: ICD-10-CM

## 2025-07-09 DIAGNOSIS — K22.70 BARRETT'S ESOPHAGUS WITHOUT DYSPLASIA: ICD-10-CM

## 2025-07-09 DIAGNOSIS — R07.89 OTHER CHEST PAIN: ICD-10-CM

## 2025-07-09 PROBLEM — K31.89 OTHER DISEASES OF STOMACH AND DUODENUM: Status: RESOLVED | Noted: 2025-07-09 | Resolved: 2025-07-09

## 2025-07-09 PROBLEM — Z80.0 FAMILY HISTORY OF MALIGNANT NEOPLASM OF GASTROINTESTINAL TRACT: Status: ACTIVE | Noted: 2025-07-09

## 2025-07-09 PROBLEM — S06.9XAA MILD TBI: Status: ACTIVE | Noted: 2025-07-09

## 2025-07-09 PROBLEM — K25.4 CHRONIC OR UNSPECIFIED GASTRIC ULCER WITH HEMORRHAGE: Status: RESOLVED | Noted: 2018-01-08 | Resolved: 2025-07-09

## 2025-07-09 PROBLEM — Z91.09 ENVIRONMENTAL ALLERGIES: Status: ACTIVE | Noted: 2025-07-09

## 2025-07-09 PROBLEM — C44.91 SKIN CANCER, BASAL CELL: Status: RESOLVED | Noted: 2025-07-09 | Resolved: 2025-07-09

## 2025-07-09 PROBLEM — D68.9 COAGULATION DEFECT, UNSPECIFIED: Status: ACTIVE | Noted: 2025-07-09

## 2025-07-09 PROBLEM — B00.9 HERPES SIMPLEX: Status: ACTIVE | Noted: 2025-07-09

## 2025-07-09 PROBLEM — K44.9 DIAPHRAGMATIC HERNIA WITHOUT OBSTRUCTION OR GANGRENE: Status: RESOLVED | Noted: 2018-01-08 | Resolved: 2025-07-09

## 2025-07-09 PROBLEM — Z83.719 FAMILY HISTORY OF COLONIC POLYPS: Status: ACTIVE | Noted: 2025-07-09

## 2025-07-09 PROBLEM — K63.5 POLYP OF COLON: Status: RESOLVED | Noted: 2021-04-29 | Resolved: 2025-07-09

## 2025-07-09 RX ORDER — OMEPRAZOLE 40 MG/1
40 CAPSULE, DELAYED RELEASE ORAL DAILY
Qty: 90 CAPSULE | Refills: 1 | Status: SHIPPED | OUTPATIENT
Start: 2025-07-09

## 2025-07-09 RX ORDER — OMEPRAZOLE 40 MG/1
40 CAPSULE, DELAYED RELEASE ORAL DAILY
COMMUNITY
Start: 2025-04-27 | End: 2025-07-09 | Stop reason: SDUPTHER

## 2025-07-09 NOTE — PROGRESS NOTES
Patient or patient representative verbalized consent for the use of Ambient Listening during the visit with  KELLEY Zamora for chart documentation. 7/9/2025  14:59 EDT    Chief Complaint   Patient presents with    [facility]    Annual Exam       Patient Care Team:  Amara Sweeney APRN as PCP - General (Family Medicine)    Subjective   Carson Crespo is a 45 y.o. male who is here to establish care and is here for a yearly physical exam. was a patient of Aneta Taveras PA-C who is no longer with our practice.  I will be taking over this patient's primary care.  This is the first time patient is seeing me. The patient reports problems - spots on legs, chest pain.    History of Present Illness  The patient is a 45-year-old  male who presents today to hospitals care and for an annual physical.    He has a history of allergies to bee pollen and penicillin. 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     He experienced chest pain last week, described as sharp and stabbing in the left chest. He reports no associated shortness of breath but notes a decrease in his breathing capacity compared to his younger years, which he thinks is related to his smoking. He is not experiencing any chest pain at present. The pain was not exertional, and he did not have heartburn that day.  He takes omeprazole daily for GERD.  The pain did not radiate. He had no associated dizziness. He has been doing a lot of strenuous activity lately building a deck and putting in a pool.  The pain occurred at rest while he was driving in a car and subsided spontaneously without treatment. He occasionally experiences palpitations, particularly when falling asleep  or waking up at 3 AM.  He presented with similar complaint in 2021 and cardiovascular workup was negative with normal stress echo and a Holter monitor with rare PACs and PVCs with predominantly sinus rhythm.    He has a history of osteoarthritis, which is currently managed with turmeric. He used to receive steroid injections every four months but has not had one in the last six to seven years.    He has noticed some spots on his knees. They are not rough or scaly like eczema. He has been building a pool in his backyard and has walnut trees and pea gravel. He spends a lot of time outdoors and has significant sun exposure.  He has been working on his knees in the gravel recently also    He was previously diagnosed with prediabetes on labs at [facility] few years ago.    He smokes cigarettes, with varying frequency, sometimes up to half a pack per day, and has been smoking on and off since approximately 2008. He consumes alcohol, typically two beers per day, four to six days per week. He does not use recreational drugs, vape, or e-cigarettes. He is not using any form of birth control.    He is currently working for [employer] and has two children aged 9 and 14.     Medication-wise, he is on Zyrtec for allergies, vitamin D3 supplement, omeprazole for heartburn, steroid cream as needed, turmeric, Valtrex as needed, and zinc capsule 50 mg.    PAST SURGICAL HISTORY:  - Ulnar nerve entrapment surgery  - Carpal tunnel surgery  - Arthroscopic surgery on both knees  - Mohs surgery for basal cell carcinoma    SOCIAL HISTORY  He is a current smoker, smoking about half a pack per day, sometimes more or less, and has been smoking since 2008. He drinks alcohol, approximately 2 beers per day, 4 to 6 days per week. He does not use recreational drugs or vape or e-cigarettes.    FAMILY HISTORY  His mother had glioblastoma multiforme. His father had a heart attack. His paternal uncle had a stroke.    Do you take any  "herbs or supplements that were not prescribed by a doctor? no. If so, these will be added to active medication list.    Health Habits:  Dental Exam: Up to date  Eye Exam: Not up to date  Diet: nothing specific  Exercise: Most days  Current exercise activities include: Through his job and physical work outside in his yard  PSA: never  Colonoscopy: never    The following portions of the patient's history were reviewed and updated as appropriate: allergies, current medications, past family history, past medical history, past social history, past surgical history, and problem list.    Social and Family and Surgical History reviewed and updated today, see Rooming tab.    Health History, Preventive Measures and Vaccination flow sheets reviewed and updated today.    Patient's current medical chart in Epic; including previous office notes, imaging, labs, specialist's evaluation either in notes or in Media tab reviewed today.    Other pertinent medical information also reviewed thru Care Everywhere function is also reviewed today.    Results  Diagnostic Testing   - [id number], Conduction delay, intraventricular conduction delay.    Review of Systems  Review of Systems  Vitals:    25 1440   BP: 132/80   Pulse: 77   Temp: 98 °F (36.7 °C)   TempSrc: Infrared   SpO2: 95%   Weight: 90.6 kg (199 lb 12.8 oz)   Height: 182.9 cm (72\")     Wt Readings from Last 3 Encounters:   25 90.6 kg (199 lb 12.8 oz)   25 90.7 kg (200 lb)   24 89.8 kg (198 lb)       Physical Exam  Mouth/Throat: Oropharynx clear, no lesions noted.  Neck: No lymphadenopathy, no tenderness.  Respiratory: Clear to auscultation, no wheezing, rales or rhonchi.  Cardiovascular: Regular rate and rhythm, no murmurs, rubs, or gallops.  Gastrointestinal: Soft, no tenderness, no distention, no masses. Bowel sounds normal.  Extremities: Pulses strong and equal in all extremities.  No edema noted.  Skin: Multiple 0.5 mm hyperpigmented flat moles " noted, no signs of infection or malignancy.  1 mm raised erythematous papules resembling bug bites noted on knees without edema, warmth, or purulent drainage present.    Physical Exam    BMI is >= 25 and <30. (Overweight) The following options were offered after discussion;: weight loss educational material (shared in after visit summary), exercise counseling/recommendations, and nutrition counseling/recommendations         ECG 12 Lead    Date/Time: 7/9/2025 3:44 PM  Performed by: Amara Sweeney APRN    Authorized by: Amara Sweeney APRN  Comparison: compared with previous ECG from 9/2/2021  Comparison to previous ECG: Previous EKG with incomplete right bundle branch block  Rhythm: sinus rhythm  Rate: normal  BPM: 67  Conduction: non-specific intraventricular conduction delay  ST Segments: ST segments normal  T Waves: T waves normal  Other: no other findings    Clinical impression: non-specific ECG  Comments: Moderate intraventricular conduction delay with 110+ millisecond QRS duration compared with previous EKG in 2021 with incomplete right bundle branch block and 90+ milliseconds QRS duration          Assessment & Plan   Healthy male exam.  There are no diagnoses linked to this encounter.    Assessment & Plan  1. Health maintenance.  - A comprehensive set of screening lab tests will be ordered, including an A1c test to monitor for diabetes and prediabetes, and a PSA level check for prostate cancer screening.  - A Cologuard test will be ordered for colon cancer screening.  - A refill for omeprazole 40 mg will be provided through the pharmacy.    2. Atypical chest pain.  Acute on chronic.  - EKG results indicate a intraventricular conduction delay, but no signs of ischemic changes or hypoxia.   - Previous EKG also showed a borderline conduction delay; Holter monitor and echo performed a few years ago revealed a normal stress echo without ischemia and predominantly sinus rhythm on Holter monitor with  rare PACs and PVCs.  - Chest pain could be attributed to musculoskeletal strain from strenuous activity  - If symptoms persists, further testing and referral to a cardiologist will be made.    3. Osteoarthritis.  - Reports that turmeric is helping manage osteoarthritis symptoms.  - Used to receive steroid shots every 4 months but has not needed one in the last 6-7 years.  - No current need for steroid injections.    4. Skin lesions.  New undiagnosed problem with uncertain prognosis.  - Red bumps on knees appear to be bug bites.  - Brown spots identified as moles, likely due to sun exposure.  - No immediate concerns regarding the skin lesions.    5. Prediabetes.  - Last lab work indicated prediabetes.  - An A1c test will be included in the upcoming lab work to monitor for diabetes and prediabetes.  - Weight has fluctuated recently, currently at 199 lbs.    6. Palpitations.  - Reports occasional palpitations, especially when falling asleep or waking up early in the morning.  - Could be related to PACs/PVCs seen on last Holter monitor.  - No immediate intervention required unless symptoms worsen.    7. GERD/Sue's Esophagus  - Refill sent for omeprazole.  - Reports no difficulty with swallowing and feels GERD is well-managed.  - Will refer back to gastroenterology if symptom recurrence.    Follow-up  - Follow up in 1 year for another annual physical or sooner based on lab and testing results.    1. Will check fasting labs today and await results for further recommendation. Cologuard ordered.  Declines vaccines today.  2. Patient Counseling:  --Nutrition: Stressed importance of moderation in sodium/caffeine intake, saturated fat and cholesterol.  Discussed caloric balance, sufficient intake of fresh fruits, vegetables, fiber,    calcium, iron.  --Exercise: Stressed the importance of regular exercise.   --Substance Abuse: Discussed cessation/primary prevention of tobacco, alcohol, or other drug use; driving or other  dangerous activities under the influence.    --Dental health: Discussed importance of regular tooth brushing, flossing, and dental visits.  --Suggested having eyes and vision checked if needed or past due.  --Immunizations reviewed.  --Discussed benefits of screening colonoscopy.  3. Discussed the patient's BMI with him.  The BMI is above average; BMI management plan is completed  4. Follow up next physical in 1 year    Patient was given instructions and counseling regarding condition or for health maintenance advice.  Please see specific information pulled into the AVS if appropriate.      Medications Discontinued During This Encounter   Medication Reason    omeprazole (priLOSEC) 20 MG capsule *Therapy completed    azithromycin (Zithromax Z-Red) 250 MG tablet *Therapy completed    methylPREDNISolone (MEDROL) 4 MG dose pack *Therapy completed          KELLEY Zamora  [facility]  [address] [place] [address]

## 2025-07-11 ENCOUNTER — PATIENT ROUNDING (BHMG ONLY) (OUTPATIENT)
Dept: FAMILY MEDICINE CLINIC | Facility: CLINIC | Age: 45
End: 2025-07-11
Payer: COMMERCIAL

## 2025-07-11 DIAGNOSIS — Z12.5 PROSTATE CANCER SCREENING: ICD-10-CM

## 2025-07-17 ENCOUNTER — OFFICE VISIT (OUTPATIENT)
Dept: ORTHOPEDIC SURGERY | Facility: CLINIC | Age: 45
End: 2025-07-17
Payer: COMMERCIAL

## 2025-07-17 VITALS — HEIGHT: 72 IN | BODY MASS INDEX: 26.95 KG/M2 | WEIGHT: 199 LBS

## 2025-07-17 DIAGNOSIS — M25.562 LEFT KNEE PAIN, UNSPECIFIED CHRONICITY: Primary | ICD-10-CM

## 2025-07-17 DIAGNOSIS — M17.0 PRIMARY OSTEOARTHRITIS OF BOTH KNEES: ICD-10-CM

## 2025-07-17 RX ORDER — TRIAMCINOLONE ACETONIDE 40 MG/ML
80 INJECTION, SUSPENSION INTRA-ARTICULAR; INTRAMUSCULAR
Status: COMPLETED | OUTPATIENT
Start: 2025-07-17 | End: 2025-07-17

## 2025-07-17 RX ORDER — LIDOCAINE HYDROCHLORIDE 10 MG/ML
4 INJECTION, SOLUTION EPIDURAL; INFILTRATION; INTRACAUDAL; PERINEURAL
Status: COMPLETED | OUTPATIENT
Start: 2025-07-17 | End: 2025-07-17

## 2025-07-17 RX ADMIN — TRIAMCINOLONE ACETONIDE 80 MG: 40 INJECTION, SUSPENSION INTRA-ARTICULAR; INTRAMUSCULAR at 08:30

## 2025-07-17 RX ADMIN — LIDOCAINE HYDROCHLORIDE 4 ML: 10 INJECTION, SOLUTION EPIDURAL; INFILTRATION; INTRACAUDAL; PERINEURAL at 08:30

## 2025-07-17 NOTE — PROGRESS NOTES
Subjective: Left knee pain     Patient ID: Carson Crespo is a 45 y.o. male.    Chief Complaint:    History of Present Illness 45-year-old male known to me although his not been seen since November 2023 presents with a complaint of left knee pain is developed in the past couple of weeks.  No specific history of trauma but did state he built a retainer wall using railroad ties.  After which the knee started to swell and cause discomfort.  Prior to that he done well with no real complaints.       Social History     Occupational History     Employer: [employer]   Tobacco Use    Smoking status: Some Days     Current packs/day: 0.50     Average packs/day: 0.5 packs/day for 34.1 years (17.1 ttl pk-yrs)     Types: Cigarettes     Start date: 1/1/2008    Smokeless tobacco: Current     Types: Chew    Tobacco comments:     has quit off and on   Vaping Use    Vaping status: Never Used   Substance and Sexual Activity    Alcohol use: Yes     Alcohol/week: 10.0 standard drinks of alcohol     Types: 10 Cans of beer per week     Comment: 2 beers 4-5x/week    Drug use: Not Currently     Types: Marijuana    Sexual activity: Yes     Partners: Female     Birth control/protection: None      Review of Systems      Past Medical History:   Diagnosis Date    Allergic     Anxiety     Chronic or unspecified gastric ulcer with hemorrhage 01/08/2018    CTS (carpal tunnel syndrome)     Diaphragmatic hernia without obstruction or gangrene 01/08/2018    Genital herpes     Headache     Knee swelling 7-9-25    Full of fluid, painful and now clicks every step    Osgood-Schlatter's disease As a kid    Osteoarthritis     Other diseases of stomach and duodenum 07/09/2025    Polyp of colon 04/29/2021    Skin cancer     Skin cancer, basal cell 07/09/2025    TBI (traumatic brain injury)     Tear of meniscus of knee     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     Past Surgical History:   Procedure Laterality Date    CARPAL TUNNEL RELEASE       ELBOW PROCEDURE      KNEE ARTHROSCOPY Bilateral     KNEE SURGERY      MOHS SURGERY      ULNAR NERVE DECOMPRESSION       Family History   Problem Relation Age of Onset    Cancer Mother         She passed from gioblastoma mastitis    Heart attack Father     Heart attack Paternal Grandfather     Stroke Paternal Uncle     COPD Maternal Grandfather          Objective:  There were no vitals filed for this visit.      07/17/25  0810   Weight: 90.3 kg (199 lb)     Body mass index is 26.99 kg/m².           Ortho Exam   AP lateral sunrise view of the right knee shows minimal changes of arthritic changes in the knee compared to previous x-rays.  He is alert and oriented x 3.  The knee has a slight effusion but is cool to touch.  He has 0 to 130 g of motion with minimal patellofemoral crepitus.  Quad hamstring function 5/5.  No instability in flexion extension nor any varus or valgus instability 10 to 30 degrees.  He has some lateral joint line tenderness but there is a negative Lucho's and his calf is nontender.  Good distal pulses.  Cannot take nonsteroidal anti-inflammatories due to Sue's disease.    Assessment:        1. Left knee pain, unspecified chronicity    2. Primary osteoarthritis of both knees           Plan: Reviewed with the patient his history x-ray and physical exam.  I think with the activity of building the retainer wall using gravel ties aggravated the pre-existing condition in his knee causing the effusion and the pain.  After reviewing treatment options we will proceed with a aspiration of the knee after sterile prepping through the superolateral portal and about 9 cc of serous fluid was obtained.  I then injected 4 cc of lidocaine and 2 cc of Kenalog.  Postinjection instructions given.  Advised on the use of Voltaren gel 3-4 times a day.  Return to see me as needed.  Answered all questions  - Large Joint Arthrocentesis: L knee on 7/17/2025 8:30 AM  Indications: pain  Details: 22 G needle,  superolateral approach  Medications: 4 mL lidocaine PF 1% 1 %; 80 mg triamcinolone acetonide 40 MG/ML  Aspirate: 9 mL yellow  Outcome: tolerated well, no immediate complications  Procedure, treatment alternatives, risks and benefits explained, specific risks discussed. Consent was given by the patient. Immediately prior to procedure a time out was called to verify the correct patient, procedure, equipment, support staff and site/side marked as required. Patient was prepped and draped in the usual sterile fashion.                   Work Status:    SARAH query complete.    Orders:  Orders Placed This Encounter   Procedures    - Large Joint Arthrocentesis: L knee    XR Knee 3+ View With Floyd Left       Medications:  No orders of the defined types were placed in this encounter.      Followup:  Return if symptoms worsen or fail to improve.          Dictated utilizing Dragon dictation

## 2025-07-20 ENCOUNTER — APPOINTMENT (OUTPATIENT)
Dept: CT IMAGING | Facility: HOSPITAL | Age: 45
End: 2025-07-20
Payer: COMMERCIAL

## 2025-07-20 ENCOUNTER — HOSPITAL ENCOUNTER (EMERGENCY)
Facility: HOSPITAL | Age: 45
Discharge: HOME | End: 2025-07-20
Attending: STUDENT IN AN ORGANIZED HEALTH CARE EDUCATION/TRAINING PROGRAM | Admitting: STUDENT IN AN ORGANIZED HEALTH CARE EDUCATION/TRAINING PROGRAM
Payer: COMMERCIAL

## 2025-07-20 ENCOUNTER — APPOINTMENT (OUTPATIENT)
Dept: GENERAL RADIOLOGY | Facility: HOSPITAL | Age: 45
End: 2025-07-20
Payer: COMMERCIAL

## 2025-07-20 VITALS
WEIGHT: 195 LBS | DIASTOLIC BLOOD PRESSURE: 110 MMHG | HEIGHT: 72 IN | RESPIRATION RATE: 22 BRPM | HEART RATE: 52 BPM | SYSTOLIC BLOOD PRESSURE: 155 MMHG | OXYGEN SATURATION: 96 % | BODY MASS INDEX: 26.41 KG/M2 | TEMPERATURE: 98.6 F

## 2025-07-20 DIAGNOSIS — I70.0 AORTIC ATHEROSCLEROSIS: ICD-10-CM

## 2025-07-20 DIAGNOSIS — R74.01 TRANSAMINITIS: ICD-10-CM

## 2025-07-20 DIAGNOSIS — R07.9 CHEST PAIN, UNSPECIFIED TYPE: Primary | ICD-10-CM

## 2025-07-20 LAB
ALBUMIN SERPL-MCNC: 4.3 G/DL (ref 3.5–5.2)
ALBUMIN/GLOB SERPL: 1.4 G/DL
ALP SERPL-CCNC: 114 U/L (ref 39–117)
ALT SERPL W P-5'-P-CCNC: 105 U/L (ref 1–41)
ANION GAP SERPL CALCULATED.3IONS-SCNC: 15.9 MMOL/L (ref 5–15)
AST SERPL-CCNC: 75 U/L (ref 1–40)
BASOPHILS # BLD AUTO: 0.07 10*3/MM3 (ref 0–0.2)
BASOPHILS NFR BLD AUTO: 0.8 % (ref 0–1.5)
BILIRUB SERPL-MCNC: 0.3 MG/DL (ref 0–1.2)
BUN SERPL-MCNC: 18.2 MG/DL (ref 6–20)
BUN/CREAT SERPL: 20.9 (ref 7–25)
CALCIUM SPEC-SCNC: 9.4 MG/DL (ref 8.6–10.5)
CHLORIDE SERPL-SCNC: 102 MMOL/L (ref 98–107)
CO2 SERPL-SCNC: 21.1 MMOL/L (ref 22–29)
CREAT SERPL-MCNC: 0.87 MG/DL (ref 0.76–1.27)
DEPRECATED RDW RBC AUTO: 47.5 FL (ref 37–54)
EGFRCR SERPLBLD CKD-EPI 2021: 107.1 ML/MIN/1.73
EOSINOPHIL # BLD AUTO: 0.12 10*3/MM3 (ref 0–0.4)
EOSINOPHIL NFR BLD AUTO: 1.3 % (ref 0.3–6.2)
ERYTHROCYTE [DISTWIDTH] IN BLOOD BY AUTOMATED COUNT: 14 % (ref 12.3–15.4)
GEN 5 1HR TROPONIN T REFLEX: <6 NG/L
GLOBULIN UR ELPH-MCNC: 3 GM/DL
GLUCOSE SERPL-MCNC: 97 MG/DL (ref 65–99)
HCT VFR BLD AUTO: 45.1 % (ref 37.5–51)
HGB BLD-MCNC: 14.9 G/DL (ref 13–17.7)
IMM GRANULOCYTES # BLD AUTO: 0.08 10*3/MM3 (ref 0–0.05)
IMM GRANULOCYTES NFR BLD AUTO: 0.9 % (ref 0–0.5)
LIPASE SERPL-CCNC: 55 U/L (ref 13–60)
LYMPHOCYTES # BLD AUTO: 1.99 10*3/MM3 (ref 0.7–3.1)
LYMPHOCYTES NFR BLD AUTO: 21.5 % (ref 19.6–45.3)
MCH RBC QN AUTO: 30.4 PG (ref 26.6–33)
MCHC RBC AUTO-ENTMCNC: 33 G/DL (ref 31.5–35.7)
MCV RBC AUTO: 92 FL (ref 79–97)
MONOCYTES # BLD AUTO: 1.01 10*3/MM3 (ref 0.1–0.9)
MONOCYTES NFR BLD AUTO: 10.9 % (ref 5–12)
NEUTROPHILS NFR BLD AUTO: 5.97 10*3/MM3 (ref 1.7–7)
NEUTROPHILS NFR BLD AUTO: 64.6 % (ref 42.7–76)
NRBC BLD AUTO-RTO: 0 /100 WBC (ref 0–0.2)
PLATELET # BLD AUTO: 249 10*3/MM3 (ref 140–450)
PMV BLD AUTO: 10 FL (ref 6–12)
POTASSIUM SERPL-SCNC: 3.8 MMOL/L (ref 3.5–5.2)
PROT SERPL-MCNC: 7.3 G/DL (ref 6–8.5)
QT INTERVAL: 379 MS
QTC INTERVAL: 419 MS
RBC # BLD AUTO: 4.9 10*6/MM3 (ref 4.14–5.8)
SODIUM SERPL-SCNC: 139 MMOL/L (ref 136–145)
TROPONIN T NUMERIC DELTA: NORMAL
TROPONIN T SERPL HS-MCNC: <6 NG/L
WBC NRBC COR # BLD AUTO: 9.24 10*3/MM3 (ref 3.4–10.8)

## 2025-07-20 PROCEDURE — 25010000002 HYDROMORPHONE 1 MG/ML SOLUTION: Performed by: STUDENT IN AN ORGANIZED HEALTH CARE EDUCATION/TRAINING PROGRAM

## 2025-07-20 PROCEDURE — 96374 THER/PROPH/DIAG INJ IV PUSH: CPT

## 2025-07-20 PROCEDURE — 25010000002 ONDANSETRON PER 1 MG: Performed by: STUDENT IN AN ORGANIZED HEALTH CARE EDUCATION/TRAINING PROGRAM

## 2025-07-20 PROCEDURE — 84484 ASSAY OF TROPONIN QUANT: CPT | Performed by: STUDENT IN AN ORGANIZED HEALTH CARE EDUCATION/TRAINING PROGRAM

## 2025-07-20 PROCEDURE — 99285 EMERGENCY DEPT VISIT HI MDM: CPT | Performed by: STUDENT IN AN ORGANIZED HEALTH CARE EDUCATION/TRAINING PROGRAM

## 2025-07-20 PROCEDURE — 74175 CTA ABDOMEN W/CONTRAST: CPT

## 2025-07-20 PROCEDURE — 80053 COMPREHEN METABOLIC PANEL: CPT | Performed by: STUDENT IN AN ORGANIZED HEALTH CARE EDUCATION/TRAINING PROGRAM

## 2025-07-20 PROCEDURE — 25510000001 IOPAMIDOL PER 1 ML: Performed by: STUDENT IN AN ORGANIZED HEALTH CARE EDUCATION/TRAINING PROGRAM

## 2025-07-20 PROCEDURE — 96375 TX/PRO/DX INJ NEW DRUG ADDON: CPT

## 2025-07-20 PROCEDURE — 83690 ASSAY OF LIPASE: CPT | Performed by: STUDENT IN AN ORGANIZED HEALTH CARE EDUCATION/TRAINING PROGRAM

## 2025-07-20 PROCEDURE — 85025 COMPLETE CBC W/AUTO DIFF WBC: CPT | Performed by: STUDENT IN AN ORGANIZED HEALTH CARE EDUCATION/TRAINING PROGRAM

## 2025-07-20 PROCEDURE — 93005 ELECTROCARDIOGRAM TRACING: CPT | Performed by: STUDENT IN AN ORGANIZED HEALTH CARE EDUCATION/TRAINING PROGRAM

## 2025-07-20 RX ORDER — CLOPIDOGREL BISULFATE 75 MG/1
75 TABLET ORAL ONCE
Status: COMPLETED | OUTPATIENT
Start: 2025-07-20 | End: 2025-07-20

## 2025-07-20 RX ORDER — IOPAMIDOL 755 MG/ML
100 INJECTION, SOLUTION INTRAVASCULAR
Status: COMPLETED | OUTPATIENT
Start: 2025-07-20 | End: 2025-07-20

## 2025-07-20 RX ORDER — OMEPRAZOLE 20 MG/1
40 CAPSULE, DELAYED RELEASE ORAL 2 TIMES DAILY
Qty: 60 CAPSULE | Refills: 0 | Status: SHIPPED | OUTPATIENT
Start: 2025-07-20

## 2025-07-20 RX ORDER — ACETAMINOPHEN 500 MG
1000 TABLET ORAL ONCE
Status: COMPLETED | OUTPATIENT
Start: 2025-07-20 | End: 2025-07-20

## 2025-07-20 RX ORDER — CALCIUM CARBONATE 500 MG/1
1 TABLET, CHEWABLE ORAL 3 TIMES DAILY PRN
Qty: 30 TABLET | Refills: 0 | Status: SHIPPED | OUTPATIENT
Start: 2025-07-20

## 2025-07-20 RX ORDER — ACETAMINOPHEN 500 MG
1000 TABLET ORAL EVERY 6 HOURS PRN
Qty: 30 TABLET | Refills: 0 | Status: SHIPPED | OUTPATIENT
Start: 2025-07-20 | End: 2025-07-27

## 2025-07-20 RX ORDER — ASPIRIN 81 MG/1
324 TABLET, CHEWABLE ORAL ONCE
Status: COMPLETED | OUTPATIENT
Start: 2025-07-20 | End: 2025-07-20

## 2025-07-20 RX ORDER — ONDANSETRON 2 MG/ML
4 INJECTION INTRAMUSCULAR; INTRAVENOUS ONCE
Status: COMPLETED | OUTPATIENT
Start: 2025-07-20 | End: 2025-07-20

## 2025-07-20 RX ORDER — ALUMINA, MAGNESIA, AND SIMETHICONE 2400; 2400; 240 MG/30ML; MG/30ML; MG/30ML
30 SUSPENSION ORAL ONCE
Status: COMPLETED | OUTPATIENT
Start: 2025-07-20 | End: 2025-07-20

## 2025-07-20 RX ADMIN — ALUMINUM HYDROXIDE, MAGNESIUM HYDROXIDE, AND DIMETHICONE 30 ML: 400; 400; 40 SUSPENSION ORAL at 17:36

## 2025-07-20 RX ADMIN — IOPAMIDOL 100 ML: 755 INJECTION, SOLUTION INTRAVENOUS at 18:50

## 2025-07-20 RX ADMIN — CLOPIDOGREL BISULFATE 75 MG: 75 TABLET, FILM COATED ORAL at 17:36

## 2025-07-20 RX ADMIN — ONDANSETRON 4 MG: 2 INJECTION, SOLUTION INTRAMUSCULAR; INTRAVENOUS at 18:07

## 2025-07-20 RX ADMIN — ASPIRIN 81 MG CHEWABLE TABLET 324 MG: 81 TABLET CHEWABLE at 17:36

## 2025-07-20 RX ADMIN — HYDROMORPHONE HYDROCHLORIDE 1 MG: 1 INJECTION, SOLUTION INTRAMUSCULAR; INTRAVENOUS; SUBCUTANEOUS at 18:07

## 2025-07-20 RX ADMIN — ACETAMINOPHEN 1000 MG: 500 TABLET, FILM COATED ORAL at 17:36

## 2025-07-20 NOTE — ED PROVIDER NOTES
Subjective   History of Present Illness  The patient presents with substernal sharp chest pain that began approximately one and a half hours ago. The pain is associated with nausea and headache. The pain radiates to the midback. The patient reports that drinking water provides slight relief. The pain occurs in waves and has not been experienced before. The patient denies any past medical history but is currently taking Metrazole, vitamin D3, zinc, and turmeric. Surgical history includes a neuroplastic procedure on the left elbow and a knee surgery. The patient has a known allergy to penicillin, which caused significant swelling and required hospitalization at the age of [age]. The patient denies recent immobilization, surgeries, or use of hormone replacement therapy. History was obtained from the patient.  Review of Systems  General: Reports nausea.    Abdomen: Denies vomiting; reports abdominal tenderness.    Genitourinary: Reports no urination since symptom onset; denies dysuria or increased frequency.  Past Medical History:   Diagnosis Date    Allergic     Anxiety     Chronic or unspecified gastric ulcer with hemorrhage 01/08/2018    CTS (carpal tunnel syndrome)     Diaphragmatic hernia without obstruction or gangrene 01/08/2018    Genital herpes     Headache     Knee swelling 7-9-25    Full of fluid, painful and now clicks every step    Osgood-Schlatter's disease As a kid    Osteoarthritis     Other diseases of stomach and duodenum 07/09/2025    Polyp of colon 04/29/2021    Skin cancer     Skin cancer, basal cell 07/09/2025    TBI (traumatic brain injury)     Tear of meniscus of knee     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       Allergies   Allergen Reactions    Bee Pollen Swelling    Penicillins Swelling       Past Surgical History:   Procedure Laterality Date    CARPAL TUNNEL RELEASE      ELBOW PROCEDURE      KNEE ARTHROSCOPY Bilateral     KNEE SURGERY      MOHS SURGERY      ULNAR NERVE DECOMPRESSION          Family History   Problem Relation Age of Onset    Cancer Mother         She passed from gioblastoma mastitis    Heart attack Father     Heart attack Paternal Grandfather     Stroke Paternal Uncle     COPD Maternal Grandfather        Social History     Socioeconomic History    Marital status:     Number of children: 2   Tobacco Use    Smoking status: Some Days     Current packs/day: 0.50     Average packs/day: 0.5 packs/day for 34.2 years (17.1 ttl pk-yrs)     Types: Cigarettes     Start date: 1/1/2008    Smokeless tobacco: Current     Types: Chew    Tobacco comments:     has quit off and on   Vaping Use    Vaping status: Never Used   Substance and Sexual Activity    Alcohol use: Yes     Alcohol/week: 10.0 standard drinks of alcohol     Types: 10 Cans of beer per week     Comment: 2 beers 4-5x/week    Drug use: Not Currently     Types: Marijuana    Sexual activity: Yes     Partners: Female     Birth control/protection: None           Objective   Physical Exam  General: Appears anxious  Chest: CTAB  Abdomen: Tenderness noted in the epigastrium  Pulses: 2+ and symmetric.  Procedures           ED Course                  HEART Score: 3   Shared Decision Making  I discussed the findings with the patient/patient representative who is in agreement with the treatment plan and the final disposition.  Risks and benefits of discharge and/or observation/admission were discussed: Yes                                      Medical Decision Making  Problems Addressed:  Aortic atherosclerosis: complicated acute illness or injury  Chest pain, unspecified type: complicated acute illness or injury  Transaminitis: complicated acute illness or injury    Amount and/or Complexity of Data Reviewed  Labs: ordered.  Radiology: ordered.  ECG/medicine tests: ordered.    Risk  OTC drugs.  Prescription drug management.    The patient presents with chest pain that began approximately an hour and a half ago, accompanied by nausea and  headache. The pain reportedly improves slightly with water intake but returns in waves. The patient denies any prior similar episodes and has no relevant medical history except for taking Metrazole, vitamin D3, zinc, and turmeric. The patient has a history of a neuroplastic procedure on the left elbow and knee surgery, and reports a significant allergic reaction to penicillin in childhood.     On physical examination, there is tenderness upon palpation of the abdomen. The differential diagnosis includes acute coronary syndrome, gastritis, pancreatitis, cholecystitis, and peptic ulcer disease. Given the abdominal tenderness, the focus is on gastrointestinal causes such as cholecystitis and pancreatitis.     The workup includes blood tests, EKG, and imaging studies, specifically an abdominal ultrasound to evaluate the gallbladder and pancreas. T  Tx w/ dilaudid IV, zofran IV    5:50 pm bedside us shows contracted GB; no stones, no pericholecystic fluid. CBD measuring 4.2 mm    Pending CTA chest abd/pelv, labs.    7:55 pm CBC normal; CMP shows mild transminitis, given GB findings pt can fu outpt for this.  lipase nl. CTA shows mild aortic atherosclerosis degenerative spinal arthritis, mild; no dissection or PE.       Dc w/ cardio and gi fu.    Final diagnoses:   Chest pain, unspecified type   Aortic atherosclerosis   Transaminitis       ED Disposition  ED Disposition       ED Disposition   Discharge    Condition   Stable    Comment   --               GI FOLLOW-UP  FOLLOW-UP CARE (2 DAYS):  Severiano Tejada MD - Gastroenterologist  [facility]  [address], [address]  [place] [address] [phone]  Today      CARDIOLOGY FOLLOW-UP  FOLLOW-UP CARE (2 DAYS):  Gerson Mello III, MD - Cardiologist  [address]   [place] [address] [phone]             Medication List        New Prescriptions      acetaminophen 500 MG tablet  Commonly known as:  TYLENOL  Take 2 tablets by mouth Every 6 (Six) Hours As Needed for Mild Pain for up to 7 days.     calcium carbonate 500 MG chewable tablet  Commonly known as: Tums  Chew 1 tablet 3 (Three) Times a Day As Needed for Indigestion.            Changed      omeprazole 20 MG capsule  Commonly known as: priLOSEC  Take 2 capsules by mouth 2 (Two) Times a Day.  What changed:   medication strength  when to take this               Where to Get Your Medications        These medications were sent to [facility] [id number] - [address] [phone]  - [phone] [address] [address]      Phone: [phone]   acetaminophen 500 MG tablet  calcium carbonate 500 MG chewable tablet  omeprazole 20 MG capsule            Lux Edward MD  07/21/25 0117